# Patient Record
Sex: MALE | Race: WHITE | NOT HISPANIC OR LATINO | Employment: FULL TIME | URBAN - METROPOLITAN AREA
[De-identification: names, ages, dates, MRNs, and addresses within clinical notes are randomized per-mention and may not be internally consistent; named-entity substitution may affect disease eponyms.]

---

## 2021-05-06 ENCOUNTER — APPOINTMENT (OUTPATIENT)
Dept: RADIOLOGY | Facility: MEDICAL CENTER | Age: 26
End: 2021-05-06
Attending: EMERGENCY MEDICINE
Payer: COMMERCIAL

## 2021-05-06 ENCOUNTER — HOSPITAL ENCOUNTER (OUTPATIENT)
Facility: MEDICAL CENTER | Age: 26
End: 2021-05-07
Attending: EMERGENCY MEDICINE | Admitting: STUDENT IN AN ORGANIZED HEALTH CARE EDUCATION/TRAINING PROGRAM
Payer: COMMERCIAL

## 2021-05-06 DIAGNOSIS — M79.10 MYALGIA: ICD-10-CM

## 2021-05-06 PROBLEM — T50.Z95A VACCINATION REACTION: Status: ACTIVE | Noted: 2021-05-06

## 2021-05-06 PROBLEM — A41.9 SEPSIS (HCC): Status: ACTIVE | Noted: 2021-05-06

## 2021-05-06 PROBLEM — R74.8 ELEVATED CPK: Status: ACTIVE | Noted: 2021-05-06

## 2021-05-06 LAB
ALBUMIN SERPL BCP-MCNC: 3.4 G/DL (ref 3.2–4.9)
ALBUMIN/GLOB SERPL: 1.3 G/DL
ALP SERPL-CCNC: 101 U/L (ref 30–99)
ALT SERPL-CCNC: 32 U/L (ref 2–50)
ANION GAP SERPL CALC-SCNC: 10 MMOL/L (ref 7–16)
APPEARANCE UR: CLEAR
AST SERPL-CCNC: 24 U/L (ref 12–45)
BASOPHILS # BLD AUTO: 0.6 % (ref 0–1.8)
BASOPHILS # BLD: 0.03 K/UL (ref 0–0.12)
BILIRUB SERPL-MCNC: 0.6 MG/DL (ref 0.1–1.5)
BILIRUB UR QL STRIP.AUTO: NEGATIVE
BUN SERPL-MCNC: 12 MG/DL (ref 8–22)
CALCIUM SERPL-MCNC: 7.9 MG/DL (ref 8.5–10.5)
CHLORIDE SERPL-SCNC: 104 MMOL/L (ref 96–112)
CK SERPL-CCNC: 241 U/L (ref 0–154)
CK SERPL-CCNC: 271 U/L (ref 0–154)
CO2 SERPL-SCNC: 22 MMOL/L (ref 20–33)
COLOR UR: YELLOW
CREAT SERPL-MCNC: 0.92 MG/DL (ref 0.5–1.4)
EKG IMPRESSION: NORMAL
EOSINOPHIL # BLD AUTO: 0 K/UL (ref 0–0.51)
EOSINOPHIL NFR BLD: 0 % (ref 0–6.9)
ERYTHROCYTE [DISTWIDTH] IN BLOOD BY AUTOMATED COUNT: 36.5 FL (ref 35.9–50)
FLUAV RNA SPEC QL NAA+PROBE: NEGATIVE
FLUBV RNA SPEC QL NAA+PROBE: NEGATIVE
GLOBULIN SER CALC-MCNC: 2.6 G/DL (ref 1.9–3.5)
GLUCOSE SERPL-MCNC: 105 MG/DL (ref 65–99)
GLUCOSE UR STRIP.AUTO-MCNC: NEGATIVE MG/DL
HCT VFR BLD AUTO: 45.4 % (ref 42–52)
HGB BLD-MCNC: 16.2 G/DL (ref 14–18)
IMM GRANULOCYTES # BLD AUTO: 0.02 K/UL (ref 0–0.11)
IMM GRANULOCYTES NFR BLD AUTO: 0.4 % (ref 0–0.9)
KETONES UR STRIP.AUTO-MCNC: NEGATIVE MG/DL
LACTATE BLD-SCNC: 1.2 MMOL/L (ref 0.5–2)
LACTATE BLD-SCNC: 2.8 MMOL/L (ref 0.5–2)
LEUKOCYTE ESTERASE UR QL STRIP.AUTO: NEGATIVE
LYMPHOCYTES # BLD AUTO: 0.71 K/UL (ref 1–4.8)
LYMPHOCYTES NFR BLD: 13.8 % (ref 22–41)
MCH RBC QN AUTO: 31.5 PG (ref 27–33)
MCHC RBC AUTO-ENTMCNC: 35.7 G/DL (ref 33.7–35.3)
MCV RBC AUTO: 88.3 FL (ref 81.4–97.8)
MICRO URNS: NORMAL
MONOCYTES # BLD AUTO: 0.51 K/UL (ref 0–0.85)
MONOCYTES NFR BLD AUTO: 9.9 % (ref 0–13.4)
NEUTROPHILS # BLD AUTO: 3.88 K/UL (ref 1.82–7.42)
NEUTROPHILS NFR BLD: 75.3 % (ref 44–72)
NITRITE UR QL STRIP.AUTO: NEGATIVE
NRBC # BLD AUTO: 0 K/UL
NRBC BLD-RTO: 0 /100 WBC
PH UR STRIP.AUTO: 7.5 [PH] (ref 5–8)
PLATELET # BLD AUTO: 144 K/UL (ref 164–446)
PMV BLD AUTO: 10.9 FL (ref 9–12.9)
POTASSIUM SERPL-SCNC: 3.8 MMOL/L (ref 3.6–5.5)
PROCALCITONIN SERPL-MCNC: 0.12 NG/ML
PROT SERPL-MCNC: 6 G/DL (ref 6–8.2)
PROT UR QL STRIP: NEGATIVE MG/DL
RBC # BLD AUTO: 5.14 M/UL (ref 4.7–6.1)
RBC UR QL AUTO: NEGATIVE
RSV RNA SPEC QL NAA+PROBE: NEGATIVE
SARS-COV-2 RNA RESP QL NAA+PROBE: NOTDETECTED
SODIUM SERPL-SCNC: 136 MMOL/L (ref 135–145)
SP GR UR STRIP.AUTO: 1.02
SPECIMEN SOURCE: NORMAL
TROPONIN T SERPL-MCNC: 7 NG/L (ref 6–19)
UROBILINOGEN UR STRIP.AUTO-MCNC: 1 MG/DL
WBC # BLD AUTO: 5.2 K/UL (ref 4.8–10.8)

## 2021-05-06 PROCEDURE — 700117 HCHG RX CONTRAST REV CODE 255: Performed by: EMERGENCY MEDICINE

## 2021-05-06 PROCEDURE — 80061 LIPID PANEL: CPT

## 2021-05-06 PROCEDURE — A9270 NON-COVERED ITEM OR SERVICE: HCPCS | Performed by: EMERGENCY MEDICINE

## 2021-05-06 PROCEDURE — 93005 ELECTROCARDIOGRAM TRACING: CPT

## 2021-05-06 PROCEDURE — 80053 COMPREHEN METABOLIC PANEL: CPT | Mod: 91

## 2021-05-06 PROCEDURE — 0241U HCHG SARS-COV-2 COVID-19 NFCT DS RESP RNA 4 TRGT MIC: CPT

## 2021-05-06 PROCEDURE — 82077 ASSAY SPEC XCP UR&BREATH IA: CPT

## 2021-05-06 PROCEDURE — 71275 CT ANGIOGRAPHY CHEST: CPT

## 2021-05-06 PROCEDURE — 700102 HCHG RX REV CODE 250 W/ 637 OVERRIDE(OP): Performed by: STUDENT IN AN ORGANIZED HEALTH CARE EDUCATION/TRAINING PROGRAM

## 2021-05-06 PROCEDURE — 93005 ELECTROCARDIOGRAM TRACING: CPT | Performed by: EMERGENCY MEDICINE

## 2021-05-06 PROCEDURE — 85027 COMPLETE CBC AUTOMATED: CPT

## 2021-05-06 PROCEDURE — 85025 COMPLETE CBC W/AUTO DIFF WBC: CPT

## 2021-05-06 PROCEDURE — 96365 THER/PROPH/DIAG IV INF INIT: CPT

## 2021-05-06 PROCEDURE — G0378 HOSPITAL OBSERVATION PER HR: HCPCS

## 2021-05-06 PROCEDURE — 87040 BLOOD CULTURE FOR BACTERIA: CPT

## 2021-05-06 PROCEDURE — 82550 ASSAY OF CK (CPK): CPT | Mod: 91

## 2021-05-06 PROCEDURE — 84484 ASSAY OF TROPONIN QUANT: CPT

## 2021-05-06 PROCEDURE — 83605 ASSAY OF LACTIC ACID: CPT | Mod: 91

## 2021-05-06 PROCEDURE — 99285 EMERGENCY DEPT VISIT HI MDM: CPT

## 2021-05-06 PROCEDURE — 83880 ASSAY OF NATRIURETIC PEPTIDE: CPT

## 2021-05-06 PROCEDURE — 99219 PR INITIAL OBSERVATION CARE,LEVL II: CPT | Performed by: STUDENT IN AN ORGANIZED HEALTH CARE EDUCATION/TRAINING PROGRAM

## 2021-05-06 PROCEDURE — 700105 HCHG RX REV CODE 258: Performed by: EMERGENCY MEDICINE

## 2021-05-06 PROCEDURE — A9270 NON-COVERED ITEM OR SERVICE: HCPCS | Performed by: STUDENT IN AN ORGANIZED HEALTH CARE EDUCATION/TRAINING PROGRAM

## 2021-05-06 PROCEDURE — 96375 TX/PRO/DX INJ NEW DRUG ADDON: CPT

## 2021-05-06 PROCEDURE — 700102 HCHG RX REV CODE 250 W/ 637 OVERRIDE(OP): Performed by: EMERGENCY MEDICINE

## 2021-05-06 PROCEDURE — 700111 HCHG RX REV CODE 636 W/ 250 OVERRIDE (IP): Performed by: EMERGENCY MEDICINE

## 2021-05-06 PROCEDURE — 84145 PROCALCITONIN (PCT): CPT

## 2021-05-06 PROCEDURE — 72128 CT CHEST SPINE W/O DYE: CPT

## 2021-05-06 PROCEDURE — 36415 COLL VENOUS BLD VENIPUNCTURE: CPT

## 2021-05-06 PROCEDURE — 81003 URINALYSIS AUTO W/O SCOPE: CPT

## 2021-05-06 PROCEDURE — C9803 HOPD COVID-19 SPEC COLLECT: HCPCS | Performed by: EMERGENCY MEDICINE

## 2021-05-06 RX ORDER — CHLORAL HYDRATE 500 MG
1000 CAPSULE ORAL DAILY
Status: SHIPPED | COMMUNITY
End: 2022-07-25

## 2021-05-06 RX ORDER — HEPARIN SODIUM 5000 [USP'U]/ML
5000 INJECTION, SOLUTION INTRAVENOUS; SUBCUTANEOUS EVERY 8 HOURS
Status: DISCONTINUED | OUTPATIENT
Start: 2021-05-06 | End: 2021-05-07 | Stop reason: HOSPADM

## 2021-05-06 RX ORDER — GUAIFENESIN/DEXTROMETHORPHAN 100-10MG/5
10 SYRUP ORAL EVERY 6 HOURS PRN
Status: DISCONTINUED | OUTPATIENT
Start: 2021-05-06 | End: 2021-05-07 | Stop reason: HOSPADM

## 2021-05-06 RX ORDER — PROMETHAZINE HYDROCHLORIDE 25 MG/1
12.5-25 SUPPOSITORY RECTAL EVERY 4 HOURS PRN
Status: DISCONTINUED | OUTPATIENT
Start: 2021-05-06 | End: 2021-05-07 | Stop reason: HOSPADM

## 2021-05-06 RX ORDER — ONDANSETRON 2 MG/ML
4 INJECTION INTRAMUSCULAR; INTRAVENOUS EVERY 4 HOURS PRN
Status: DISCONTINUED | OUTPATIENT
Start: 2021-05-06 | End: 2021-05-07 | Stop reason: HOSPADM

## 2021-05-06 RX ORDER — SODIUM CHLORIDE 9 MG/ML
1000 INJECTION, SOLUTION INTRAVENOUS CONTINUOUS
Status: ACTIVE | OUTPATIENT
Start: 2021-05-06 | End: 2021-05-06

## 2021-05-06 RX ORDER — NAPROXEN 500 MG/1
500 TABLET ORAL 2 TIMES DAILY
Status: DISCONTINUED | OUTPATIENT
Start: 2021-05-06 | End: 2021-05-07 | Stop reason: HOSPADM

## 2021-05-06 RX ORDER — CYCLOBENZAPRINE HCL 5 MG
5 TABLET ORAL EVERY EVENING
Status: DISCONTINUED | OUTPATIENT
Start: 2021-05-06 | End: 2021-05-07

## 2021-05-06 RX ORDER — NAPROXEN SODIUM 220 MG
880 TABLET ORAL 2 TIMES DAILY WITH MEALS
Status: ON HOLD | COMMUNITY
End: 2021-05-07

## 2021-05-06 RX ORDER — BISACODYL 10 MG
10 SUPPOSITORY, RECTAL RECTAL
Status: DISCONTINUED | OUTPATIENT
Start: 2021-05-06 | End: 2021-05-07 | Stop reason: HOSPADM

## 2021-05-06 RX ORDER — LABETALOL HYDROCHLORIDE 5 MG/ML
10 INJECTION, SOLUTION INTRAVENOUS EVERY 4 HOURS PRN
Status: DISCONTINUED | OUTPATIENT
Start: 2021-05-06 | End: 2021-05-07 | Stop reason: HOSPADM

## 2021-05-06 RX ORDER — ACETAMINOPHEN 325 MG/1
650 TABLET ORAL EVERY 6 HOURS PRN
Status: DISCONTINUED | OUTPATIENT
Start: 2021-05-06 | End: 2021-05-07 | Stop reason: HOSPADM

## 2021-05-06 RX ORDER — PROMETHAZINE HYDROCHLORIDE 25 MG/1
12.5-25 TABLET ORAL EVERY 4 HOURS PRN
Status: DISCONTINUED | OUTPATIENT
Start: 2021-05-06 | End: 2021-05-07 | Stop reason: HOSPADM

## 2021-05-06 RX ORDER — KETOROLAC TROMETHAMINE 30 MG/ML
30 INJECTION, SOLUTION INTRAMUSCULAR; INTRAVENOUS ONCE
Status: COMPLETED | OUTPATIENT
Start: 2021-05-06 | End: 2021-05-06

## 2021-05-06 RX ORDER — DIAZEPAM 5 MG/ML
5 INJECTION, SOLUTION INTRAMUSCULAR; INTRAVENOUS ONCE
Status: COMPLETED | OUTPATIENT
Start: 2021-05-06 | End: 2021-05-06

## 2021-05-06 RX ORDER — ONDANSETRON 4 MG/1
4 TABLET, ORALLY DISINTEGRATING ORAL EVERY 4 HOURS PRN
Status: DISCONTINUED | OUTPATIENT
Start: 2021-05-06 | End: 2021-05-07 | Stop reason: HOSPADM

## 2021-05-06 RX ORDER — PROCHLORPERAZINE EDISYLATE 5 MG/ML
5-10 INJECTION INTRAMUSCULAR; INTRAVENOUS EVERY 4 HOURS PRN
Status: DISCONTINUED | OUTPATIENT
Start: 2021-05-06 | End: 2021-05-07 | Stop reason: HOSPADM

## 2021-05-06 RX ORDER — POLYETHYLENE GLYCOL 3350 17 G/17G
1 POWDER, FOR SOLUTION ORAL
Status: DISCONTINUED | OUTPATIENT
Start: 2021-05-06 | End: 2021-05-07 | Stop reason: HOSPADM

## 2021-05-06 RX ORDER — ACETAMINOPHEN 325 MG/1
650 TABLET ORAL ONCE
Status: COMPLETED | OUTPATIENT
Start: 2021-05-06 | End: 2021-05-06

## 2021-05-06 RX ORDER — AMOXICILLIN 250 MG
2 CAPSULE ORAL 2 TIMES DAILY
Status: DISCONTINUED | OUTPATIENT
Start: 2021-05-06 | End: 2021-05-07 | Stop reason: HOSPADM

## 2021-05-06 RX ADMIN — ACETAMINOPHEN 650 MG: 325 TABLET, FILM COATED ORAL at 18:32

## 2021-05-06 RX ADMIN — SODIUM CHLORIDE 1000 ML: 9 INJECTION, SOLUTION INTRAVENOUS at 19:58

## 2021-05-06 RX ADMIN — NAPROXEN 500 MG: 500 TABLET ORAL at 23:23

## 2021-05-06 RX ADMIN — CYCLOBENZAPRINE 5 MG: 10 TABLET, FILM COATED ORAL at 23:23

## 2021-05-06 RX ADMIN — KETOROLAC TROMETHAMINE 30 MG: 30 INJECTION, SOLUTION INTRAMUSCULAR; INTRAVENOUS at 18:32

## 2021-05-06 RX ADMIN — IOHEXOL 100 ML: 350 INJECTION, SOLUTION INTRAVENOUS at 19:09

## 2021-05-06 RX ADMIN — CEFTRIAXONE SODIUM 2 G: 2 INJECTION, POWDER, FOR SOLUTION INTRAMUSCULAR; INTRAVENOUS at 19:57

## 2021-05-06 RX ADMIN — SODIUM CHLORIDE 1000 ML: 9 INJECTION, SOLUTION INTRAVENOUS at 18:32

## 2021-05-06 RX ADMIN — DIAZEPAM 5 MG: 5 INJECTION, SOLUTION INTRAMUSCULAR; INTRAVENOUS at 18:32

## 2021-05-06 ASSESSMENT — PAIN DESCRIPTION - PAIN TYPE
TYPE: ACUTE PAIN
TYPE: ACUTE PAIN

## 2021-05-06 NOTE — ED TRIAGE NOTES
Chief Complaint   Patient presents with   • Back Pain     mid back spasm, ambulatory   • Other     post covid vaccine fever, body aches

## 2021-05-07 VITALS
HEIGHT: 72 IN | TEMPERATURE: 98.1 F | WEIGHT: 182.76 LBS | SYSTOLIC BLOOD PRESSURE: 114 MMHG | OXYGEN SATURATION: 95 % | RESPIRATION RATE: 17 BRPM | HEART RATE: 84 BPM | DIASTOLIC BLOOD PRESSURE: 68 MMHG | BODY MASS INDEX: 24.75 KG/M2

## 2021-05-07 LAB
ALBUMIN SERPL BCP-MCNC: 3.4 G/DL (ref 3.2–4.9)
ALBUMIN/GLOB SERPL: 1.4 G/DL
ALP SERPL-CCNC: 95 U/L (ref 30–99)
ALT SERPL-CCNC: 31 U/L (ref 2–50)
ANION GAP SERPL CALC-SCNC: 6 MMOL/L (ref 7–16)
AST SERPL-CCNC: 23 U/L (ref 12–45)
BILIRUB SERPL-MCNC: 0.4 MG/DL (ref 0.1–1.5)
BUN SERPL-MCNC: 10 MG/DL (ref 8–22)
CALCIUM SERPL-MCNC: 7.8 MG/DL (ref 8.5–10.5)
CHLORIDE SERPL-SCNC: 109 MMOL/L (ref 96–112)
CHOLEST SERPL-MCNC: 115 MG/DL (ref 100–199)
CO2 SERPL-SCNC: 24 MMOL/L (ref 20–33)
CREAT SERPL-MCNC: 0.9 MG/DL (ref 0.5–1.4)
EKG IMPRESSION: NORMAL
ERYTHROCYTE [DISTWIDTH] IN BLOOD BY AUTOMATED COUNT: 37.9 FL (ref 35.9–50)
EST. AVERAGE GLUCOSE BLD GHB EST-MCNC: 94 MG/DL
ETHANOL BLD-MCNC: <10.1 MG/DL (ref 0–10)
GLOBULIN SER CALC-MCNC: 2.4 G/DL (ref 1.9–3.5)
GLUCOSE SERPL-MCNC: 114 MG/DL (ref 65–99)
HBA1C MFR BLD: 4.9 % (ref 4–5.6)
HCT VFR BLD AUTO: 41.5 % (ref 42–52)
HDLC SERPL-MCNC: 40 MG/DL
HGB BLD-MCNC: 14.2 G/DL (ref 14–18)
LACTATE BLD-SCNC: 1.3 MMOL/L (ref 0.5–2)
LDLC SERPL CALC-MCNC: 62 MG/DL
MCH RBC QN AUTO: 30.9 PG (ref 27–33)
MCHC RBC AUTO-ENTMCNC: 34.2 G/DL (ref 33.7–35.3)
MCV RBC AUTO: 90.2 FL (ref 81.4–97.8)
NT-PROBNP SERPL IA-MCNC: <5 PG/ML (ref 0–125)
PLATELET # BLD AUTO: 107 K/UL (ref 164–446)
PMV BLD AUTO: 10.9 FL (ref 9–12.9)
POTASSIUM SERPL-SCNC: 3.8 MMOL/L (ref 3.6–5.5)
PROT SERPL-MCNC: 5.8 G/DL (ref 6–8.2)
RBC # BLD AUTO: 4.6 M/UL (ref 4.7–6.1)
SODIUM SERPL-SCNC: 139 MMOL/L (ref 135–145)
TRIGL SERPL-MCNC: 67 MG/DL (ref 0–149)
WBC # BLD AUTO: 4.4 K/UL (ref 4.8–10.8)

## 2021-05-07 PROCEDURE — A9270 NON-COVERED ITEM OR SERVICE: HCPCS | Performed by: STUDENT IN AN ORGANIZED HEALTH CARE EDUCATION/TRAINING PROGRAM

## 2021-05-07 PROCEDURE — G0378 HOSPITAL OBSERVATION PER HR: HCPCS

## 2021-05-07 PROCEDURE — A9270 NON-COVERED ITEM OR SERVICE: HCPCS | Performed by: NURSE PRACTITIONER

## 2021-05-07 PROCEDURE — 700102 HCHG RX REV CODE 250 W/ 637 OVERRIDE(OP): Performed by: STUDENT IN AN ORGANIZED HEALTH CARE EDUCATION/TRAINING PROGRAM

## 2021-05-07 PROCEDURE — 97535 SELF CARE MNGMENT TRAINING: CPT

## 2021-05-07 PROCEDURE — 700102 HCHG RX REV CODE 250 W/ 637 OVERRIDE(OP): Performed by: NURSE PRACTITIONER

## 2021-05-07 PROCEDURE — 93005 ELECTROCARDIOGRAM TRACING: CPT | Performed by: STUDENT IN AN ORGANIZED HEALTH CARE EDUCATION/TRAINING PROGRAM

## 2021-05-07 PROCEDURE — 99217 PR OBSERVATION CARE DISCHARGE: CPT | Performed by: INTERNAL MEDICINE

## 2021-05-07 PROCEDURE — 93010 ELECTROCARDIOGRAM REPORT: CPT | Performed by: INTERNAL MEDICINE

## 2021-05-07 PROCEDURE — 83036 HEMOGLOBIN GLYCOSYLATED A1C: CPT

## 2021-05-07 RX ORDER — CYCLOBENZAPRINE HCL 5 MG
10 TABLET ORAL ONCE
Status: COMPLETED | OUTPATIENT
Start: 2021-05-07 | End: 2021-05-07

## 2021-05-07 RX ORDER — ACETAMINOPHEN 325 MG/1
650 TABLET ORAL EVERY 6 HOURS PRN
Qty: 30 TABLET | Refills: 0 | Status: SHIPPED | COMMUNITY
Start: 2021-05-07 | End: 2021-08-31

## 2021-05-07 RX ORDER — CYCLOBENZAPRINE HCL 5 MG
5-10 TABLET ORAL 3 TIMES DAILY PRN
Qty: 18 TABLET | Refills: 0 | Status: SHIPPED | OUTPATIENT
Start: 2021-05-07 | End: 2021-05-10

## 2021-05-07 RX ADMIN — CYCLOBENZAPRINE HYDROCHLORIDE 10 MG: 5 TABLET, FILM COATED ORAL at 08:35

## 2021-05-07 RX ADMIN — NAPROXEN 500 MG: 500 TABLET ORAL at 05:04

## 2021-05-07 ASSESSMENT — PATIENT HEALTH QUESTIONNAIRE - PHQ9
1. LITTLE INTEREST OR PLEASURE IN DOING THINGS: NOT AT ALL
SUM OF ALL RESPONSES TO PHQ9 QUESTIONS 1 AND 2: 0
1. LITTLE INTEREST OR PLEASURE IN DOING THINGS: NOT AT ALL
2. FEELING DOWN, DEPRESSED, IRRITABLE, OR HOPELESS: NOT AT ALL
SUM OF ALL RESPONSES TO PHQ9 QUESTIONS 1 AND 2: 0
2. FEELING DOWN, DEPRESSED, IRRITABLE, OR HOPELESS: NOT AT ALL

## 2021-05-07 ASSESSMENT — LIFESTYLE VARIABLES
DOES PATIENT WANT TO STOP DRINKING: NO
TOTAL SCORE: 0
AVERAGE NUMBER OF DAYS PER WEEK YOU HAVE A DRINK CONTAINING ALCOHOL: 0
CONSUMPTION TOTAL: NEGATIVE
ON A TYPICAL DAY WHEN YOU DRINK ALCOHOL HOW MANY DRINKS DO YOU HAVE: 0
TOTAL SCORE: 0
ALCOHOL_USE: NO
HAVE YOU EVER FELT YOU SHOULD CUT DOWN ON YOUR DRINKING: NO
EVER HAD A DRINK FIRST THING IN THE MORNING TO STEADY YOUR NERVES TO GET RID OF A HANGOVER: NO
HAVE PEOPLE ANNOYED YOU BY CRITICIZING YOUR DRINKING: NO
TOTAL SCORE: 0
HOW MANY TIMES IN THE PAST YEAR HAVE YOU HAD 5 OR MORE DRINKS IN A DAY: 0
EVER FELT BAD OR GUILTY ABOUT YOUR DRINKING: NO

## 2021-05-07 ASSESSMENT — PAIN DESCRIPTION - PAIN TYPE
TYPE: ACUTE PAIN
TYPE: ACUTE PAIN

## 2021-05-07 ASSESSMENT — FIBROSIS 4 INDEX: FIB4 SCORE: 0.74

## 2021-05-07 NOTE — THERAPY
Physical Therapy   Education only     Patient Name: Cesar Rubio  Age:  25 y.o., Sex:  male  Medical Record #: [id number]  Today's Date: 5/7/2021     Assessment    Pt was recently admitted for mid back spasm and body aches from COVID vaccines. Pt and RN state pt is primarily I with functional mobility and ambulation at this time. Pt provided with education on body mechanics, icing, resting, and gentle stretching to improve mid back thoracic pain. Pt educated on activity limitations and to avoid going to the gym at this time. Pt was agreeable. Pt is in no acute skilled PT needs at this time, anticipate pt to d/c home once medically clear. Recommend outpatient physical therapy services to address higher level deficits and for pain management of thoracic back pain.     SUNSHINE FairT

## 2021-05-07 NOTE — ED NOTES
Pt to CDU with transport and personal belongings.    I have personally performed a face to face diagnostic evaluation on this patient. I have reviewed the ACP note and agree with the history, exam and plan of care, except as noted.

## 2021-05-07 NOTE — ASSESSMENT & PLAN NOTE
In setting of 2nd covid vaccine given in right shoulder evening of 5/5/21  In setting of recent strenuous physical activity and soft tissue strain  >2L IVF given in ED  >Trend CPK in AM for improvement  Restart IVF in AM (Patient refused further IVF)

## 2021-05-07 NOTE — ASSESSMENT & PLAN NOTE
After 2nd Pfitzer vaccine in setting of strained back and fatigued muscles from strenuous exercise prior to receiving vaccine.  -assoc with fever, myalgia, mild Cpk ELEVATION  >Trend CPK, give IVF  >Naproxen, Muscle relaxants  >Septic workup, lactic acid trended down with IVF, f/u BCx obtained in ED  >Drug and alcohol tox

## 2021-05-07 NOTE — PROGRESS NOTES
2 RN Skin Assessment Completed by Sheri RN and CAPRI Dorantes.     Head: WDL  Ears: WDL  Nose: WDL  Mouth: WDL  Neck: WDL  Breasts/Chest: WDL  Shoulder Blades: WDL  Spine: WDL  (R) Arm/Elbow/hand: WDL  (L) Arm/Elbow/hand: WDL  Abdomen:WDL  Groin: WDL  Sacrum/Coccyx/Buttocks: WDL  (R) Leg: WDL  (L) Leg: WDL  (R) Heel/Foot/Toe: WDL  (L) Heel/Foot/Toe: WDL              Devices in place: BP Cuff and Pulse Ox     Interventions in place: Pillows     Possible skin injury found: No     Pictures uploaded into Epic: N/A  Wound Consult Placed: N/A

## 2021-05-07 NOTE — PROGRESS NOTES
Pt arrived to unit via gurney at 0010. Pt oriented to room, unit, and plan of care. All questions answered at this time. Call light within reach, fall precautions in place, will continue to monitor.

## 2021-05-07 NOTE — ASSESSMENT & PLAN NOTE
This is Sepsis Present on admission  SIRS criteria identified on my evaluation include: Fever, with temperature greater than 101 deg F, Tachycardia, with heart rate greater than 90 BPM and Leukocytosis, with WBC greater than 12,000  Source is Unknown  Suspected onset of infection (date and time) 5/5/21  Sepsis protocol initiated  Fluid resuscitation ordered per protocol  IV antibiotics as appropriate for source of sepsis  While organ dysfunction may be noted elsewhere in this problem list or in the chart, degree of organ dysfunction does not meet CMS criteria for severe sepsis    >f/u BCx, Procal negative. WBC negative but with left shift. Given rocephin in ED, will watch off antibiotics for now for resolution of septic criteria or worsening. UA negative.

## 2021-05-07 NOTE — PROGRESS NOTES
IV AND TELE DCED INSTRUCTIONS GIVEN ALL QUESTIONS ANSWERED  TO LOBBY WITH STAFF VIA AMB CONDITION STABLE

## 2021-05-07 NOTE — DISCHARGE SUMMARY
Discharge Summary    CHIEF COMPLAINT ON ADMISSION  Chief Complaint   Patient presents with   • Back Pain     mid back spasm, ambulatory   • Other     post covid vaccine fever, body aches       Reason for Admission  Other     Admission Date  5/6/2021    CODE STATUS  Full Code    HPI & HOSPITAL COURSE  Patient is a pleasant 25-year-old male with no known medical problems.  Patient presented to the emergency room with complaints of severe back spasms, fever, mild nausea and malaise following second Pfizer vaccination.  Patient states he normally works out 5 days a week in the gym and feels he may have pulled a muscle in his back the day prior to his vaccination.  Back pain significant therefore patient admitted to CDU overnight for observation and PT evaluation following morning.  Patient was rated symptom management as needed with antiemetics, analgesics and muscle relaxers.  Patient placed on IV fluid hydration.  CT T-spine completed which showed no acute fracture or malalignment appreciated.  CTA chest showed no evidence of pulmonary emboli.  Patient noted to have mild CPK elevation however decreased with IV fluid hydration.  Lactic acid initially 2.8 however returned to baseline with IV fluid hydration.  On exam this morning patient states he feels significantly better, no longer current complaints of fever, chills or flulike symptoms.  States back pain is more like usual muscle pain he is used to from pulling things at the gym.  Patient states tolerable with muscle relaxers.  Patient evaluated by physical therapy who states he can continue with outpatient therapy.  Patient unsure if he would like to participate in outpatient therapy and feels he can manage this at home with stretching and light exercise.  Referral placed if patient changes his mind about obtaining outpatient physical therapy.  Patient's vital signs are stable and patient states he feels he is ready to discharge home at this time.  Muscle relaxer  prescription with a limited amount and quantity sent to pharmacy.  Patient up ambulating independently and ready for discharge at this time.  Patient to follow-up with his PCP.    Therefore, he is discharged in good and stable condition to home with close outpatient follow-up.        Discharge Date  05/07/21    FOLLOW UP ITEMS POST DISCHARGE  Please follow-up with PCP as needed  Take medications as needed  Take ibuprofen with food and limit to 600 mg at a time.  Do not drink or drive while on muscle relaxers    DISCHARGE DIAGNOSES  Active Problems:    Vaccination reaction POA: Yes    Elevated CPK POA: Yes    Myalgia POA: Yes    Sepsis (HCC) POA: Yes  Resolved Problems:    * No resolved hospital problems. *      FOLLOW UP    [address] [address] [phone]          MEDICATIONS ON DISCHARGE     Medication List      START taking these medications      Instructions   acetaminophen 325 MG Tabs  Commonly known as: Tylenol   Take 2 Tablets by mouth every 6 hours as needed.  Dose: 650 mg     cyclobenzaprine 5 mg tablet  Commonly known as: Flexeril   Take 1-2 Tablets by mouth 3 times a day as needed for Moderate Pain or Muscle Spasms for up to 3 days.  Dose: 5-10 mg        CONTINUE taking these medications      Instructions   fish oil 1000 MG Caps capsule   Take 1,000 mg by mouth every day.  Dose: 1,000 mg        STOP taking these medications    Aleve 220 MG tablet  Generic drug: naproxen            Allergies  No Known Allergies    DIET  Orders Placed This Encounter   Procedures   • Diet Order Diet: Cardiac     Standing Status:   Standing     Number of Occurrences:   1     Order Specific Question:   Diet:     Answer:   Cardiac [6]       ACTIVITY  As tolerated.  Weight bearing as tolerated    CONSULTATIONS  None     PROCEDURES  None     LABORATORY  Lab Results   Component Value Date    SODIUM 139 05/06/2021    POTASSIUM 3.8 05/06/2021    CHLORIDE 109 05/06/2021    CO2  24 05/06/2021    GLUCOSE 114 (H) 05/06/2021    BUN 10 05/06/2021    CREATININE 0.90 05/06/2021        Lab Results   Component Value Date    WBC 4.4 (L) 05/06/2021    HEMOGLOBIN 14.2 05/06/2021    HEMATOCRIT 41.5 (L) 05/06/2021    PLATELETCT 107 (L) 05/06/2021        Total time of the discharge process exceeds 37 minutes.

## 2021-05-07 NOTE — ED NOTES
IV started. Blood drawn, urine collected, and sent to lab. Patient medicated per MAR. Updated on POC, pt agrees

## 2021-05-07 NOTE — ED PROVIDER NOTES
ED Provider Note    CHIEF COMPLAINT  Chief Complaint   Patient presents with   • Back Pain     mid back spasm, ambulatory   • Other     post covid vaccine fever, body aches       HPI  Cesar Rubio is a 25 y.o. male who presents to the emergency room today with fever of 102 degrees, back pain/spasms, body aches and generalized malaise.  Patient states he started feeling bad after working out on Wednesday with sudden onset following of back spasm to his lower back that went away.  He received a Covid shot/vaccine recently a second vaccine of Pfizer.  Following this he developed fever chills increasing pain and muscle aches pain over his mid back below his shoulder blades.  No nausea vomiting no cough or congestion or sore throat.  Patient was tachycardic, with fever and meeting SIRS criteria of 3 and septic protocol initiated.  He was given antibiotics here of Rocephin.  Suspect that this may be reaction to his Covid vaccination however his lactic acid was elevated he was given IV fluids 2 L.,  Lactic acid was elevated.  Pain is over his mid to lower back worse with movement or direct palpation.    REVIEW OF SYSTEMS  See HPI for further details. All other systems are negative.     PAST MEDICAL HISTORY  No past medical history on file.    FAMILY HISTORY  [unfilled]    SOCIAL HISTORY  Social History     Socioeconomic History   • Marital status: Single     Spouse name: Not on file   • Number of children: Not on file   • Years of education: Not on file   • Highest education level: Not on file   Occupational History   • Not on file   Tobacco Use   • Smoking status: Never Smoker   • Smokeless tobacco: Never Used   Substance and Sexual Activity   • Alcohol use: Never   • Drug use: Never   • Sexual activity: Not on file   Other Topics Concern   • Not on file   Social History Narrative   • Not on file     Social Determinants of Health     Financial Resource Strain:    • Difficulty of Paying Living Expenses:    Food  Insecurity:    • Worried About Running Out of Food in the Last Year:    • Ran Out of Food in the Last Year:    Transportation Needs:    • Lack of Transportation (Medical):    • Lack of Transportation (Non-Medical):    Physical Activity:    • Days of Exercise per Week:    • Minutes of Exercise per Session:    Stress:    • Feeling of Stress :    Social Connections:    • Frequency of Communication with Friends and Family:    • Frequency of Social Gatherings with Friends and Family:    • Attends Congregational Services:    • Active Member of Clubs or Organizations:    • Attends Club or Organization Meetings:    • Marital Status:    Intimate Partner Violence:    • Fear of Current or Ex-Partner:    • Emotionally Abused:    • Physically Abused:    • Sexually Abused:        SURGICAL HISTORY  No past surgical history on file.    CURRENT MEDICATIONS  Home Medications     Reviewed by Ninoska Marie (Pharmacy Tech) on 05/06/21 at 4330  Med List Status: Complete   Medication Last Dose Status   naproxen (ALEVE) 220 MG tablet 5/6/2021 Active   Omega-3 Fatty Acids (FISH OIL) 1000 MG Cap capsule >3 days ago Active                ALLERGIES  No Known Allergies    PHYSICAL EXAM  VITAL SIGNS: /57   Pulse 85   Temp (!) 38.8 °C (101.9 °F) (Oral)   Resp 18   Wt 81.5 kg (179 lb 10.8 oz)   SpO2 96%       Constitutional: Well developed, Well nourished,  acute distress, Non-toxic appearance.   HENT: Normocephalic, Atraumatic, Bilateral external ears normal, Oropharynx moist, No oral exudates, Nose normal.   Eyes: PERRLA, EOMI, Conjunctiva normal, No discharge.   Neck: Normal range of motion, No tenderness, Supple, No stridor.  No meningeal signs noted  Lymphatic: No lymphadenopathy noted.   Cardiovascular: Normal heart rate, Normal rhythm, No murmurs, No rubs, No gallops.   Thorax & Lungs: Normal breath sounds, No respiratory distress, No wheezing, No chest tenderness.   Abdomen: Bowel sounds normal, Soft, No tenderness, No  masses, No pulsatile masses.   Skin: Warm, Dry, No erythema, No rash.   Back: Thoracolumbar tenderness, No CVA tenderness.  No deformity or signs or symptoms of infection    Extremities: Intact distal pulses, No edema, No tenderness, No cyanosis, No clubbing.   Musculoskeletal: Good range of motion in all major joints. No tenderness to palpation or major deformities noted.   Neurologic: Alert & oriented x 3, Normal motor function, Normal sensory function, No focal deficits noted.   Psychiatric: Affect normal, Judgment normal, Mood normal.     EKG  Sinus tachycardia 130 bpm, no ST elevation or depression, no widening of the QRS complex, good R wave progression, NJ intervals are normal, this is a 12-lead EKG there is no previous EKG available at this time for comparison    RADIOLOGY/PROCEDURES  CT-TSPINE W/O PLUS RECONS   Final Result         1. No acute fracture or malalignment appreciated in the thoracic spine         CT-CTA CHEST PULMONARY ARTERY W/ RECONS   Final Result      1.  No evidence of pulmonary embolism.      2.  Clear lung parenchyma.                  COURSE & MEDICAL DECISION MAKING  Pertinent Labs & Imaging studies reviewed. (See chart for details)  Patient had CT scan performed because there was some pain that crossed over the chest with some shortness of breath and tachycardia this was negative for PE there is no evidence of Covid pneumonia.  CT scan his lumbar spine showed no acute processes this is seem to be the origination of his current pain.  He has no history of IVDA, he had received Covid vaccine developed fever this may be reaction to there is no obvious source of infection L he did follow septic protocol was given fluids at greater than 30 cc/kg and his heart rate has come down and responded and he was also given antibiotics of Rocephin.  I discussed the case with his mother by phone.  Patient was given pain medication and did get good results with this and his temperature has come down.   Discussed case with hospitalist as is lactic acid was elevated 2.8 and he will be admitted for further monitoring and treatment.  Please see orders for further planning care.    FINAL IMPRESSION  1.  Febrile illness  2.  Covid vaccine reaction  3.  Sepsis, lactic acid of 2.8  4.  Myalgia  5.  Lumbar back pain         Electronically signed by: Moe Rivas D.O., 5/6/2021 11:17 PM

## 2021-05-07 NOTE — ED NOTES
Med rec complete per interview with pt at bedside. Pt states that he took a methocarbamol that he received from a friend earlier today. Allergies reviewed. Pt denies any antibiotic use in the past 14 days.

## 2021-05-07 NOTE — H&P
"Hospital Medicine History & Physical Note    Date of Service  5/6/2021    Primary Care Physician  No primary care provider on file.    Consultants  None    Code Status  Full Code    Chief Complaint  Chief Complaint   Patient presents with   • Back Pain     mid back spasm, ambulatory   • Other     post covid vaccine fever, body aches       History of Presenting Illness  25M never hospitalized before no known medical problems presented with myalgia, severe back spasms, fever, mild nausea, and malaise following 2nd Pfitzer vaccination. Evening of 5/5. Patient reports doing heavy strenuous exercise in gym on the 5th. 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 He stated during his gym session before getting the vaccination, he felt he \"pulled or strained a ligament in my back\". Otherwise, patient Denies sob, cough, chest pain/pressure, congestion, extremity swelling, diaphoresis, dizziness, weakness, unintentional weight changes, fever, chills, vomiting, hemoptysis, diarrhea, constipation, dysuria/dyspareunia, polyuria, or polydipsia.    Denies history of Malignancy, drug, alcohol, or cannabinoid use.      Review of Systems  ROS  10+ systems reviewed and negative except as noted in HPI.    Past Medical History   has no past medical history on file.    Surgical History   has no past surgical history on file.     Family History  No history of autoimmune or immunodeficiency in parents or grandparents.    Social History   reports that he has never smoked. He has never used smokeless tobacco. He reports that he does not drink alcohol and does not use drugs.    Allergies  No Known Allergies    Medications  Prior to Admission Medications   Prescriptions Last Dose Informant Patient Reported? Taking?   Omega-3 Fatty Acids (FISH OIL) 1000 MG Cap capsule >3 days ago at unknown Patient Yes Yes   Sig: Take 1,000 mg by mouth every day.   naproxen (ALEVE) 220 MG tablet 5/6/2021 at 1000 Patient Yes Yes   Sig: Take 880 mg " by mouth 2 times a day with meals.      Facility-Administered Medications: None       Physical Exam  Temp:  [37.8 °C (100 °F)-38.8 °C (101.9 °F)] 38.8 °C (101.9 °F)  Pulse:  [] 85  Resp:  [18-20] 18  BP: (101-120)/(54-81) 104/57  SpO2:  [95 %-99 %] 96 %    Physical Exam  Physical Exam  Vitals and nursing note reviewed.  Constitutional:     General: Alert in no acute distress.    Appearance: Pt is not ill-appearing.     Comments:   HENT: No signs of trauma, Bilateral external ears normal, Nose normal.      Head: Normocephalic.     Mouth/Throat: Unremarkable. Moist Mucosa     Comments:   Eyes:      Pupils: Pupils are equal round and reactive to light, Conjunctiva normal, Non-icteric.   Neck: Normal range of motion, No tenderness, Supple, No stridor.   Cardiovascular:      Rate and Rhythm: Regular Rate and Rhythm     Heart sounds: No murmur, rubs, or gallops appreciated.  Pulmonary/Thorax & Lungs:      Effort: No respiratory distress.     Breath sounds: No stridor. No wheezing, No Rhonchi, no palpable chest tenderness     Comments:    Abdomen:     General: There is no distension.      Palpation/Auscultation: Soft, No tenderness, No masses, No pulsatile masses. Bowel sounds normal. No rebound/peritoneal signs. Negative Traore sign.     Hernia: No hernia is appreciated at this time.   Skin: Warm, Dry, No erythema, No rash.       Coloration: Skin is not jaundiced or pale.   Back: No bony tenderness, No CVA tenderness.   Musculoskeletal:         General: Difficult to complete exam, patient lying on side in back pain exacerbated by movement. Gross motor strength 5/5 x4 limbs, decreased lumbothoracic range of motion in all planes, decreased hip flexion 2/2 back pain. Some Decreased ROM all 4 extremities due to pain.  Extremities:       General: Intact distal pulses, No edema, No tenderness, No cyanosis      Vascular:   Neurologic/Psych: Alert, No focal deficits noted.       Comments:                     Laboratory:  Recent Labs     05/06/21  1822   WBC 5.2   RBC 5.14   HEMOGLOBIN 16.2   HEMATOCRIT 45.4   MCV 88.3   MCH 31.5   MCHC 35.7*   RDW 36.5   PLATELETCT 144*   MPV 10.9     Recent Labs     05/06/21 2003   SODIUM 136   POTASSIUM 3.8   CHLORIDE 104   CO2 22   GLUCOSE 105*   BUN 12   CREATININE 0.92   CALCIUM 7.9*     Recent Labs     05/06/21 2003   ALTSGPT 32   ASTSGOT 24   ALKPHOSPHAT 101*   TBILIRUBIN 0.6   GLUCOSE 105*         No results for input(s): NTPROBNP in the last 72 hours.      Recent Labs     05/06/21  1822   TROPONINT 7       Imaging:  CT-TSPINE W/O PLUS RECONS   Final Result         1. No acute fracture or malalignment appreciated in the thoracic spine         CT-CTA CHEST PULMONARY ARTERY W/ RECONS   Final Result      1.  No evidence of pulmonary embolism.      2.  Clear lung parenchyma.                  Assessment/Plan:  I anticipate this patient will require at least two midnights for appropriate medical management, necessitating inpatient admission.    Vaccination reaction- (present on admission)  Assessment & Plan  After 2nd Pfitzer vaccine in setting of strained back and fatigued muscles from strenuous exercise prior to receiving vaccine.  -assoc with fever, myalgia, mild Cpk ELEVATION  >Trend CPK, give IVF  >Naproxen, Muscle relaxants  >Septic workup, lactic acid trended down with IVF, f/u BCx obtained in ED  >Drug and alcohol tox    Elevated CPK- (present on admission)  Assessment & Plan  In setting of 2nd covid vaccine given in right shoulder evening of 5/5/21  In setting of recent strenuous physical activity and soft tissue strain  >2L IVF given in ED  >Trend CPK in AM for improvement  Restart IVF in AM (Patient refused further IVF)      Myalgia- (present on admission)  Assessment & Plan  Trend cpk  PT eval tomorrow after rest  Muscle relaxants  Naproxen  Restart IVF in AM    Sepsis (HCC)- (present on admission)  Assessment & Plan  This is Sepsis Present on admission  SIRS  criteria identified on my evaluation include: Fever, with temperature greater than 101 deg F, Tachycardia, with heart rate greater than 90 BPM and Leukocytosis, with WBC greater than 12,000  Source is Unknown  Suspected onset of infection (date and time) 5/5/21  Sepsis protocol initiated  Fluid resuscitation ordered per protocol  IV antibiotics as appropriate for source of sepsis  While organ dysfunction may be noted elsewhere in this problem list or in the chart, degree of organ dysfunction does not meet CMS criteria for severe sepsis    >f/u BCx, Procal negative. WBC negative but with left shift. Given rocephin in ED, will watch off antibiotics for now for resolution of septic criteria or worsening. UA negative.

## 2021-05-11 LAB
BACTERIA BLD CULT: NORMAL
BACTERIA BLD CULT: NORMAL
SIGNIFICANT IND 70042: NORMAL
SIGNIFICANT IND 70042: NORMAL
SITE SITE: NORMAL
SITE SITE: NORMAL
SOURCE SOURCE: NORMAL
SOURCE SOURCE: NORMAL

## 2021-08-30 ENCOUNTER — TELEPHONE (OUTPATIENT)
Dept: SCHEDULING | Facility: IMAGING CENTER | Age: 26
End: 2021-08-30

## 2021-08-31 ENCOUNTER — OFFICE VISIT (OUTPATIENT)
Dept: MEDICAL GROUP | Facility: IMAGING CENTER | Age: 26
End: 2021-08-31
Payer: COMMERCIAL

## 2021-08-31 VITALS
SYSTOLIC BLOOD PRESSURE: 114 MMHG | BODY MASS INDEX: 25.33 KG/M2 | WEIGHT: 187 LBS | HEIGHT: 72 IN | TEMPERATURE: 99 F | DIASTOLIC BLOOD PRESSURE: 78 MMHG | OXYGEN SATURATION: 95 % | HEART RATE: 75 BPM

## 2021-08-31 DIAGNOSIS — Z13.31 SCREENING FOR DEPRESSION: ICD-10-CM

## 2021-08-31 DIAGNOSIS — Z71.3 DIETARY COUNSELING: ICD-10-CM

## 2021-08-31 DIAGNOSIS — Z71.82 EXERCISE COUNSELING: ICD-10-CM

## 2021-08-31 DIAGNOSIS — B35.6 TINEA CRURIS: ICD-10-CM

## 2021-08-31 DIAGNOSIS — Z00.00 WELLNESS EXAMINATION: ICD-10-CM

## 2021-08-31 PROBLEM — A41.9 SEPSIS (HCC): Status: RESOLVED | Noted: 2021-05-06 | Resolved: 2021-08-31

## 2021-08-31 PROBLEM — M79.10 MYALGIA: Status: RESOLVED | Noted: 2021-05-06 | Resolved: 2021-08-31

## 2021-08-31 PROBLEM — R21 RASH: Status: ACTIVE | Noted: 2021-08-31

## 2021-08-31 PROBLEM — T50.Z95A VACCINATION REACTION: Status: RESOLVED | Noted: 2021-05-06 | Resolved: 2021-08-31

## 2021-08-31 PROBLEM — R74.8 ELEVATED CPK: Status: RESOLVED | Noted: 2021-05-06 | Resolved: 2021-08-31

## 2021-08-31 PROCEDURE — 99214 OFFICE O/P EST MOD 30 MIN: CPT | Performed by: PHYSICIAN ASSISTANT

## 2021-08-31 ASSESSMENT — PATIENT HEALTH QUESTIONNAIRE - PHQ9: CLINICAL INTERPRETATION OF PHQ2 SCORE: 0

## 2021-08-31 ASSESSMENT — PAIN SCALES - GENERAL: PAINLEVEL: NO PAIN

## 2021-08-31 ASSESSMENT — FIBROSIS 4 INDEX: FIB4 SCORE: 1.02

## 2021-08-31 NOTE — ASSESSMENT & PLAN NOTE
Pt admits to left inner thigh/groin rash x 2 weeks. Pt unsure if worsening. Has been using Lotrimin OTC x 2-3 days. No itching or pain.

## 2021-08-31 NOTE — PATIENT INSTRUCTIONS
Jock Itch    Jock itch is an itchy rash in the groin and upper thigh area. It is a skin infection that is caused by a type of germ that lives in dark, damp places (fungus). The rash usually goes away in 2-3 weeks with treatment.  Follow these instructions at home:  Skin care  · Use skin creams, ointments, or powders exactly as told by your doctor.  · Wear loose-fitting clothes. Clothes should not rub against your groin area. Men should wear boxer shorts or loose-fitting underwear.  · Keep your groin area clean and dry.  ? Change your underwear every day.  ? Change out of wet bathing suits as soon as you can.  ? After bathing, use a separate towel to dry your groin area. Dry the area gently and completely.  · Avoid hot baths and showers. Hot water can make itching worse.  · Do not scratch the area.  General instructions  · Take and apply over-the-counter and prescription medicines only as told by your doctor.  · Do not share towels or clothing with other people.  · Wash your hands often with soap and water, especially after touching your groin area. If you do not have soap and water, use alcohol-based hand .  Contact a doctor if:  · Your rash:  ? Gets worse.  ? Does not get better after 2 weeks of treatment.  ? Spreads.  ? Comes back after treatment is done.  · You have any of the following:  ? A fever.  ? New or worsening redness, swelling, or pain around your rash.  ? Fluid, blood, or pus coming from your rash.  Summary  · Jock itch is an itchy rash. It affects the groin and upper thigh area.  · Jock itch usually goes away in 2-3 weeks with treatment.  · Keep your groin area clean and dry.  This information is not intended to replace advice given to you by your health care provider. Make sure you discuss any questions you have with your health care provider.  Document Released: 03/14/2011 Document Revised: 11/30/2018 Document Reviewed: 11/28/2018  Elsevier Patient Education © 2020 Elsevier Inc.      Once  resulted, your lab/test/imaging results will show up automatically in your MyChart. Please wait for my interpretation and recommendations prior to viewing your results to avoid any unnecessary confusion or misinterpretation. I will address all of the lab values that I interpret as abnormal and message you accordingly on your MyChart. I will always send you a message on your labs even if they are normal. If you do not hear back from me within 5 business days after getting your labs drawn, then please send me a message on [email or web address] so I can obtain your labs (especially if you went to an outside lab - [facility], [facility], etc). If you have any additional questions or concerns beyond my interpretation of your results, please make an appointment with me to discuss in further detail.    Please only use the [employer] messaging system for questions regarding your most recent appointment or if advised to use otherwise (glucose or blood pressure reporting). If you have any new problems or concerns, you must make an appointment to discuss. This includes any referral requests.     Thank you,    Yocasta Parker PA-C (Baker)  Physician Assistant Certified  [facility]

## 2022-06-05 NOTE — PROGRESS NOTES
Subjective:     CC:    Chief Complaint   Patient presents with   • Establish Care   • Rash     inner thigh, no pain or discomfort       HISTORY OF THE PRESENT ILLNESS: Patient is a 26 y.o. male here to establish care and for annual physical.  Patient overall feeling well.  He has been working on getting weight has been lifting weights 4 to 5 days a week.  He is trying to increase his caloric intake.  He takes omega-3, creatine and MSM supplements.    Rash  Pt admits to left inner thigh/groin rash x 2 weeks. Pt unsure if worsening. Has been using Lotrimin OTC x 2-3 days. No itching or pain.    Depression Screening    Little interest or pleasure in doing things?  0 - not at all   Feeling down, depressed , or hopeless? 0 - not at all     Allergies:   Patient has no known allergies.  Current Outpatient Medications Ordered in Epic   Medication Sig Dispense Refill   • CREATINE PO Take  by mouth.     • Methylsulfonylmethane (MSM PO) Take  by mouth.     • Omega-3 Fatty Acids (FISH OIL) 1000 MG Cap capsule Take 1,000 mg by mouth every day.       No current [facility]-ordered facility-administered medications on file.     Past Medical History:   Diagnosis Date   • Elevated CPK 5/6/2021   • Myalgia 5/6/2021   • Sepsis (HCC) 5/6/2021   • Vaccination reaction 5/6/2021     History reviewed. No pertinent surgical history.  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     From [place], [place]    No  work    No blood transfusion    No tattoos     Family History   Problem Relation Age of Onset   • Hyperlipidemia Father    • Cancer Father 61        Non-Hodgkins in 2019   • Cancer Paternal Grandmother         breast cancer       Health  Maintenance/Screening  --Diet: balanced, has been increasing calories - trying to increase muscle mass  --Exercise: 4-5 days of lifting a week. No injuries.  --Sunscreen: no, no abnl moles  --STI Screening: refused   --HIV Screening: refused  --Immunizations: pt thinks he is up to date     ROS:   Gen: no fevers/chills, no changes in weight  Eyes: no changes in vision  ENT: no sore throat, no hearing loss, no bloody nose  Pulm: no sob, no cough  CV: no chest pain, no palpitations  GI: no nausea/vomiting, no diarrhea  : no dysuria or hematuria  MSk: no myalgias  Skin: no rash  Neuro: no headaches, no numbness/tingling  Heme/Lymph: no easy bruising      Objective:     Exam: /78 (BP Location: Left arm, Patient Position: Sitting, BP Cuff Size: Adult)   Pulse 75   Temp 37.2 °C (99 °F) (Temporal)   Ht 1.829 m (6')   Wt 84.8 kg (187 lb)   SpO2 95%  Body mass index is 25.36 kg/m².  General: Normal appearing. No distress.  HEENT: Normocephalic. Eyes conjunctiva clear lids without ptosis, pupils equal.  Neck: Supple without JVD or bruit. Thyroid is not enlarged.  Pulmonary: Clear to ausculation.  Normal effort. No rales, ronchi, or wheezing.  Cardiovascular: Regular rate and rhythm without murmur. Carotid and radial pulses are intact and equal bilaterally.  Abdomen: Soft, nontender, nondistended. Normal bowel sounds. Liver and spleen are not palpable  Neurologic: Grossly nonfocal  Lymph: No cervical or supraclavicular lymph nodes are palpable  Skin: Warm and dry.  Left proximal medial thigh with 4 round mildly erythematous lesions with central clearing.  Mild scaling.  Musculoskeletal: Normal gait. No extremity cyanosis, clubbing, or edema.  Psych: Normal mood and affect. Alert and oriented x3. Judgment and insight is normal.    Assessment & Plan:   26 y.o. male with the following -    1. Wellness examination  Pleasant 26-year-old male here for annual physical.  History reviewed.  Vaccinations discussed.  Patient  will check records on second dose of varicella and hepatitis A vaccines.  Annual influenza vaccine recommended.    2. Tinea cruris  Continue using Lotrimin cream over-the-counter.  If no change after 1 week would recommend reevaluation.    3. Dietary counseling  4. Exercise counseling  Please continue working on lifestyle modifications. This includes accumulation of 150 minutes or more of moderate-intensity activity each week as tolerated and a healthy diet that is low in saturated fat, sodium, and cholesterol, such as the mediterranean diet that is typically high in fruits, vegetables, whole grains, beans, nuts, and seeds, includes olive oil as an important source of monounsaturated fat, DASH diet, and/or also consider a plant-based diet.    5. Screening for depression  PHQ0    Return if symptoms worsen or fail to improve.    Yocasta Parker PA-C (Baker)  Physician Assistant Certified  [facility]    Please note that this dictation was created using voice recognition software. I have made every reasonable attempt to correct obvious errors, but I expect that there are errors of grammar and possibly content that I did not discover before finalizing the note.   The wound was explored to base in bloodless field./No foreign body

## 2022-07-25 ENCOUNTER — OFFICE VISIT (OUTPATIENT)
Dept: MEDICAL GROUP | Facility: IMAGING CENTER | Age: 27
End: 2022-07-25
Payer: COMMERCIAL

## 2022-07-25 ENCOUNTER — HOSPITAL ENCOUNTER (OUTPATIENT)
Dept: LAB | Facility: MEDICAL CENTER | Age: 27
End: 2022-07-25
Attending: PHYSICIAN ASSISTANT
Payer: COMMERCIAL

## 2022-07-25 VITALS
HEIGHT: 72 IN | SYSTOLIC BLOOD PRESSURE: 110 MMHG | HEART RATE: 88 BPM | TEMPERATURE: 97.8 F | OXYGEN SATURATION: 98 % | BODY MASS INDEX: 28.25 KG/M2 | DIASTOLIC BLOOD PRESSURE: 80 MMHG | RESPIRATION RATE: 19 BRPM | WEIGHT: 208.6 LBS

## 2022-07-25 DIAGNOSIS — R11.0 NAUSEA: ICD-10-CM

## 2022-07-25 DIAGNOSIS — R19.7 DIARRHEA, UNSPECIFIED TYPE: ICD-10-CM

## 2022-07-25 DIAGNOSIS — R53.83 OTHER FATIGUE: ICD-10-CM

## 2022-07-25 LAB
ALBUMIN SERPL BCP-MCNC: 4.3 G/DL (ref 3.2–4.9)
ALBUMIN/GLOB SERPL: 1.4 G/DL
ALP SERPL-CCNC: 107 U/L (ref 30–99)
ALT SERPL-CCNC: 25 U/L (ref 2–50)
AMYLASE SERPL-CCNC: 52 U/L (ref 25–125)
ANION GAP SERPL CALC-SCNC: 13 MMOL/L (ref 7–16)
AST SERPL-CCNC: 21 U/L (ref 12–45)
BASOPHILS # BLD AUTO: 0 % (ref 0–1.8)
BASOPHILS # BLD: 0 K/UL (ref 0–0.12)
BILIRUB SERPL-MCNC: 0.4 MG/DL (ref 0.1–1.5)
BUN SERPL-MCNC: 17 MG/DL (ref 8–22)
CALCIUM SERPL-MCNC: 9.1 MG/DL (ref 8.4–10.2)
CHLORIDE SERPL-SCNC: 101 MMOL/L (ref 96–112)
CO2 SERPL-SCNC: 25 MMOL/L (ref 20–33)
CREAT SERPL-MCNC: 0.87 MG/DL (ref 0.5–1.4)
CRP SERPL HS-MCNC: 0.35 MG/DL (ref 0–0.75)
EOSINOPHIL # BLD AUTO: 0.26 K/UL (ref 0–0.51)
EOSINOPHIL NFR BLD: 4 % (ref 0–6.9)
ERYTHROCYTE [DISTWIDTH] IN BLOOD BY AUTOMATED COUNT: 35 FL (ref 35.9–50)
ERYTHROCYTE [SEDIMENTATION RATE] IN BLOOD BY WESTERGREN METHOD: 14 MM/HOUR (ref 0–20)
GFR SERPLBLD CREATININE-BSD FMLA CKD-EPI: 121 ML/MIN/1.73 M 2
GLOBULIN SER CALC-MCNC: 3.1 G/DL (ref 1.9–3.5)
GLUCOSE SERPL-MCNC: 91 MG/DL (ref 65–99)
HCT VFR BLD AUTO: 43.7 % (ref 42–52)
HGB BLD-MCNC: 15.5 G/DL (ref 14–18)
LIPASE SERPL-CCNC: 48 U/L (ref 7–58)
LYMPHOCYTES # BLD AUTO: 2.57 K/UL (ref 1–4.8)
LYMPHOCYTES NFR BLD: 39 % (ref 22–41)
MANUAL DIFF BLD: NORMAL
MCH RBC QN AUTO: 30.6 PG (ref 27–33)
MCHC RBC AUTO-ENTMCNC: 35.5 G/DL (ref 33.7–35.3)
MCV RBC AUTO: 86.2 FL (ref 81.4–97.8)
MONOCYTES # BLD AUTO: 1.19 K/UL (ref 0–0.85)
MONOCYTES NFR BLD AUTO: 18 % (ref 0–13.4)
NEUTROPHILS # BLD AUTO: 2.57 K/UL (ref 1.82–7.42)
NEUTROPHILS NFR BLD: 39 % (ref 44–72)
NRBC # BLD AUTO: 0 K/UL
NRBC BLD-RTO: 0 /100 WBC
PLATELET # BLD AUTO: 234 K/UL (ref 164–446)
PLATELET BLD QL SMEAR: NORMAL
PMV BLD AUTO: 10.3 FL (ref 9–12.9)
POTASSIUM SERPL-SCNC: 3.6 MMOL/L (ref 3.6–5.5)
PROT SERPL-MCNC: 7.4 G/DL (ref 6–8.2)
RBC # BLD AUTO: 5.07 M/UL (ref 4.7–6.1)
RBC BLD AUTO: NORMAL
SODIUM SERPL-SCNC: 139 MMOL/L (ref 135–145)
TSH SERPL DL<=0.005 MIU/L-ACNC: 1.38 UIU/ML (ref 0.38–5.33)
WBC # BLD AUTO: 6.6 K/UL (ref 4.8–10.8)

## 2022-07-25 PROCEDURE — 80074 ACUTE HEPATITIS PANEL: CPT

## 2022-07-25 PROCEDURE — 85007 BL SMEAR W/DIFF WBC COUNT: CPT

## 2022-07-25 PROCEDURE — 86140 C-REACTIVE PROTEIN: CPT

## 2022-07-25 PROCEDURE — 83690 ASSAY OF LIPASE: CPT

## 2022-07-25 PROCEDURE — 84443 ASSAY THYROID STIM HORMONE: CPT

## 2022-07-25 PROCEDURE — 36415 COLL VENOUS BLD VENIPUNCTURE: CPT

## 2022-07-25 PROCEDURE — 99214 OFFICE O/P EST MOD 30 MIN: CPT | Performed by: PHYSICIAN ASSISTANT

## 2022-07-25 PROCEDURE — 85025 COMPLETE CBC W/AUTO DIFF WBC: CPT

## 2022-07-25 PROCEDURE — 85652 RBC SED RATE AUTOMATED: CPT

## 2022-07-25 PROCEDURE — 82150 ASSAY OF AMYLASE: CPT

## 2022-07-25 PROCEDURE — 80053 COMPREHEN METABOLIC PANEL: CPT

## 2022-07-25 ASSESSMENT — FIBROSIS 4 INDEX: FIB4 SCORE: 0.5

## 2022-07-25 NOTE — PROGRESS NOTES
Subjective:     CC:   Chief Complaint   Patient presents with   • Diarrhea   • Fatigue       HPI:   Cesar presents today to discuss:    Diarrhea  Pt admits to fever, diarrhea, and vomiting x 1 week.  He states his symptoms started after eating chicken with cream sauce that was sitting in his fridge for about 5 days.  The fever resolved after 48 hours, then the vomiting. He still gets occasional nausea but overall is able to keep food down.  Admits to improving diarrhea.  Previously was having up to 14 episodes a day, this past weekend it was down to 7 episodes and he has had no episodes yet today.  During the diarrhea episodes he does get mild abdominal cramping.  No mucus or blood. Tmax was 101, chills, body aches that first day.  No URI symptoms.  No recent travel.  No sick contacts.    Fatigue  Patient admits to significant fatigue since recent illness.      Past Medical History:   Diagnosis Date   • Elevated CPK 5/6/2021   • Myalgia 5/6/2021   • Sepsis (HCC) 5/6/2021   • Vaccination reaction 5/6/2021     Family History   Problem Relation Age of Onset   • Hyperlipidemia Father    • Cancer Father 61        Non-Hodgkins in 2019   • Cancer Paternal Grandmother         breast cancer     History reviewed. No pertinent surgical history.  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     From [place], [place]    No  work    No blood transfusion    No tattoos     Current Outpatient Medications Ordered in Epic   Medication Sig Dispense Refill   • CREATINE PO Take  by mouth.     • Methylsulfonylmethane (MSM PO) Take  by mouth.       No current Epic-ordered facility-administered medications on file.     Patient has no known  allergies.    ROS: see hpi  Gen: no fevers/chills  Pulm: no sob, no cough  CV: no chest pain, no palpitations, no edema  GI: no nausea/vomiting, no diarrhea  Skin: no rash    Objective:   Exam:  /80 (BP Location: Left arm, Patient Position: Sitting, BP Cuff Size: Adult)   Pulse 88   Temp 36.6 °C (97.8 °F) (Skin)   Resp 19   Ht 1.829 m (6')   Wt 94.6 kg (208 lb 9.6 oz)   SpO2 98%   BMI 28.29 kg/m²    Body mass index is 28.29 kg/m².    Gen: Alert and oriented, No apparent distress.  HEENT: Head atraumatic, normocephalic. Pupils equal and round.  Neck: Neck is supple without lymphadenopathy.   Lungs: Normal effort, CTA bilaterally, no wheezes, rhonchi, or rales  CV: Regular rate and rhythm. No murmurs, rubs, or gallops.  ABD: +BS. Non-tender, non-distended. No rebound, rigidity, or guarding.  Ext: No clubbing, cyanosis, edema.    Assessment & Plan:     26 y.o. male with the following -     1. Diarrhea, unspecified type  Acute, improving.  Suspect infectious etiology related to old chicken with cream sauce.  Will check testing as listed below.  If negative will order abdominal ultrasound and GI evaluation.  Increase fluids and electrolytes.  - CULTURE STOOL; Future  - CRYPTO/GIARDIA RAPID ASSAY; Future  - CALPROTECTIN,FECAL; Future  - FECAL LACTOFERRIN QUALITATIVE; Future  - CBC WITH DIFFERENTIAL; Future  - Comp Metabolic Panel; Future  - TSH WITH REFLEX TO FT4; Future  - CRP QUANTITIVE (NON-CARDIAC); Future  - Sed Rate; Future  - AMYLASE; Future  - LIPASE; Future  - HEPATITIS PANEL ACUTE(4 COMPONENTS); Future  - OCCULT BLOOD STOOL; Future    2. Nausea  Improved, brat diet, increase fluids.  - CULTURE STOOL; Future  - CRYPTO/GIARDIA RAPID ASSAY; Future  - CALPROTECTIN,FECAL; Future  - FECAL LACTOFERRIN QUALITATIVE; Future  - CBC WITH DIFFERENTIAL; Future  - Comp Metabolic Panel; Future  - TSH WITH REFLEX TO FT4; Future  - CRP QUANTITIVE (NON-CARDIAC); Future  - Sed Rate; Future  - AMYLASE; Future  - LIPASE;  Future  - US-ABDOMEN COMPLETE SURVEY; Future  - HEPATITIS PANEL ACUTE(4 COMPONENTS); Future  - OCCULT BLOOD STOOL; Future    3. Other fatigue  - CULTURE STOOL; Future  - CRYPTO/GIARDIA RAPID ASSAY; Future  - CALPROTECTIN,FECAL; Future  - FECAL LACTOFERRIN QUALITATIVE; Future  - CBC WITH DIFFERENTIAL; Future  - Comp Metabolic Panel; Future  - TSH WITH REFLEX TO FT4; Future  - CRP QUANTITIVE (NON-CARDIAC); Future  - Sed Rate; Future  - AMYLASE; Future  - LIPASE; Future  - US-ABDOMEN COMPLETE SURVEY; Future  - HEPATITIS PANEL ACUTE(4 COMPONENTS); Future  - OCCULT BLOOD STOOL; Future    Return if symptoms worsen or fail to improve, for Will notify patient to follow-up pending tests.    Yocasta Parker PA-C (Baker)  Physician Assistant Certified  [facility]    Please note that this dictation was created using voice recognition software. I have made every reasonable attempt to correct obvious errors, but I expect that there are errors of grammar and possibly content that I did not discover before finalizing the note.

## 2022-07-25 NOTE — ASSESSMENT & PLAN NOTE
Pt admits to fever, diarrhea, and vomiting x 1 week.  He states his symptoms started after eating chicken with cream sauce that was sitting in his fridge for about 5 days.  The fever resolved after 48 hours, then the vomiting. He still gets occasional nausea but overall is able to keep food down.  Admits to improving diarrhea.  Previously was having up to 14 episodes a day, this past weekend it was down to 7 episodes and he has had no episodes yet today.  During the diarrhea episodes he does get mild abdominal cramping.  No mucus or blood. Tmax was 101, chills, body aches that first day.  No URI symptoms.  No recent travel.  No sick contacts.

## 2022-07-26 LAB
HAV IGM SERPL QL IA: NORMAL
HBV CORE IGM SER QL: NORMAL
HBV SURFACE AG SER QL: NORMAL
HCV AB SER QL: NORMAL

## 2022-07-26 NOTE — PATIENT INSTRUCTIONS
It was a pleasure meeting with you today at [facility]!    Your medical history/records and medications were reviewed today.     Please review my practice information below:    If you have any prescription refill requests, please send them via [employer] or discuss with your provider at the start of your office visits. Please allow 3-5 business days for lab and testing review and you will be contacted via [employer]) with those results, or if advised to make a follow up appointment regarding those results, then please do so.     Once resulted, your lab/test/imaging results will show up automatically in your MyChart. Please wait for my interpretation and recommendations prior to viewing your results to avoid any unnecessary confusion or misinterpretation. I will address all of the lab values that I interpret as abnormal and message you accordingly on your MyChart. I will always send you a message about your results even if they are normal. If you do not hear back from me within 5-7 business days after completing your tests, then please send me a message on [employer] so I can obtain your results (especially if you went to an outside lab or imaging center - [facility], [facility], etc).     If you have any additional questions or concerns beyond my interpretation of your results, please make an appointment with me to discuss in further detail.    Please only use the [employer]) messaging system for questions regarding your most recent appointment or if advised to use otherwise (glucose or blood pressure reporting).     If you have any new problems or concerns, you must make an appointment to discuss. This includes any referral requests, lab requests (unless advised to notify me for pre-appt labs), medication side effects, or request for medication adjustments.     Please arrive 15 minutes prior to your appointment time to complete your check-in and intake with the medical assistant.      Thank you,    Yocasta Cramer) Keith  OSMIN  Physician Assistant Certified  [facility]

## 2022-07-27 ENCOUNTER — HOSPITAL ENCOUNTER (OUTPATIENT)
Facility: MEDICAL CENTER | Age: 27
End: 2022-07-27
Attending: PHYSICIAN ASSISTANT
Payer: COMMERCIAL

## 2022-07-27 DIAGNOSIS — R53.83 OTHER FATIGUE: ICD-10-CM

## 2022-07-27 DIAGNOSIS — R19.7 DIARRHEA, UNSPECIFIED TYPE: ICD-10-CM

## 2022-07-27 DIAGNOSIS — R11.0 NAUSEA: ICD-10-CM

## 2022-07-27 LAB
G LAMBLIA+C PARVUM AG STL QL RAPID: NORMAL
SIGNIFICANT IND 70042: NORMAL
SITE SITE: NORMAL
SOURCE SOURCE: NORMAL

## 2022-07-27 PROCEDURE — 87045 FECES CULTURE AEROBIC BACT: CPT

## 2022-07-27 PROCEDURE — 87899 AGENT NOS ASSAY W/OPTIC: CPT | Mod: 91

## 2022-07-27 PROCEDURE — 87329 GIARDIA AG IA: CPT

## 2022-07-27 PROCEDURE — 87328 CRYPTOSPORIDIUM AG IA: CPT

## 2022-07-27 PROCEDURE — 83630 LACTOFERRIN FECAL (QUAL): CPT

## 2022-07-27 PROCEDURE — 87077 CULTURE AEROBIC IDENTIFY: CPT

## 2022-07-27 PROCEDURE — 83993 ASSAY FOR CALPROTECTIN FECAL: CPT

## 2022-07-28 LAB
E COLI SXT1+2 STL IA: NORMAL
SIGNIFICANT IND 70042: NORMAL
SITE SITE: NORMAL
SOURCE SOURCE: NORMAL

## 2022-07-29 LAB
BACTERIA STL CULT: NORMAL
C JEJUNI+C COLI AG STL QL: NORMAL
E COLI SXT1+2 STL IA: NORMAL
LACTOFERRIN STL QL IA: NEGATIVE
SIGNIFICANT IND 70042: NORMAL
SITE SITE: NORMAL
SOURCE SOURCE: NORMAL

## 2022-07-30 LAB — CALPROTECTIN STL-MCNT: 5 UG/G

## 2023-07-02 SDOH — ECONOMIC STABILITY: INCOME INSECURITY: IN THE LAST 12 MONTHS, WAS THERE A TIME WHEN YOU WERE NOT ABLE TO PAY THE MORTGAGE OR RENT ON TIME?: NO

## 2023-07-02 SDOH — ECONOMIC STABILITY: TRANSPORTATION INSECURITY
IN THE PAST 12 MONTHS, HAS LACK OF TRANSPORTATION KEPT YOU FROM MEETINGS, WORK, OR FROM GETTING THINGS NEEDED FOR DAILY LIVING?: NO

## 2023-07-02 SDOH — ECONOMIC STABILITY: HOUSING INSECURITY
IN THE LAST 12 MONTHS, WAS THERE A TIME WHEN YOU DID NOT HAVE A STEADY PLACE TO SLEEP OR SLEPT IN A SHELTER (INCLUDING NOW)?: NO

## 2023-07-02 SDOH — ECONOMIC STABILITY: INCOME INSECURITY: HOW HARD IS IT FOR YOU TO PAY FOR THE VERY BASICS LIKE FOOD, HOUSING, MEDICAL CARE, AND HEATING?: NOT VERY HARD

## 2023-07-02 SDOH — ECONOMIC STABILITY: TRANSPORTATION INSECURITY
IN THE PAST 12 MONTHS, HAS THE LACK OF TRANSPORTATION KEPT YOU FROM MEDICAL APPOINTMENTS OR FROM GETTING MEDICATIONS?: NO

## 2023-07-02 SDOH — HEALTH STABILITY: PHYSICAL HEALTH: ON AVERAGE, HOW MANY DAYS PER WEEK DO YOU ENGAGE IN MODERATE TO STRENUOUS EXERCISE (LIKE A BRISK WALK)?: 3 DAYS

## 2023-07-02 SDOH — ECONOMIC STABILITY: FOOD INSECURITY: WITHIN THE PAST 12 MONTHS, THE FOOD YOU BOUGHT JUST DIDN'T LAST AND YOU DIDN'T HAVE MONEY TO GET MORE.: NEVER TRUE

## 2023-07-02 SDOH — HEALTH STABILITY: MENTAL HEALTH
STRESS IS WHEN SOMEONE FEELS TENSE, NERVOUS, ANXIOUS, OR CAN'T SLEEP AT NIGHT BECAUSE THEIR MIND IS TROUBLED. HOW STRESSED ARE YOU?: NOT AT ALL

## 2023-07-02 SDOH — HEALTH STABILITY: PHYSICAL HEALTH: ON AVERAGE, HOW MANY MINUTES DO YOU ENGAGE IN EXERCISE AT THIS LEVEL?: 120 MIN

## 2023-07-02 SDOH — ECONOMIC STABILITY: FOOD INSECURITY: WITHIN THE PAST 12 MONTHS, YOU WORRIED THAT YOUR FOOD WOULD RUN OUT BEFORE YOU GOT MONEY TO BUY MORE.: NEVER TRUE

## 2023-07-02 SDOH — ECONOMIC STABILITY: HOUSING INSECURITY: IN THE LAST 12 MONTHS, HOW MANY PLACES HAVE YOU LIVED?: 2

## 2023-07-02 SDOH — ECONOMIC STABILITY: TRANSPORTATION INSECURITY
IN THE PAST 12 MONTHS, HAS LACK OF RELIABLE TRANSPORTATION KEPT YOU FROM MEDICAL APPOINTMENTS, MEETINGS, WORK OR FROM GETTING THINGS NEEDED FOR DAILY LIVING?: NO

## 2023-07-02 ASSESSMENT — SOCIAL DETERMINANTS OF HEALTH (SDOH)
ARE YOU MARRIED, WIDOWED, DIVORCED, SEPARATED, NEVER MARRIED, OR LIVING WITH A PARTNER?: NEVER MARRIED
HOW OFTEN DO YOU ATTEND CHURCH OR RELIGIOUS SERVICES?: NEVER
HOW OFTEN DO YOU ATTEND CHURCH OR RELIGIOUS SERVICES?: NEVER
HOW OFTEN DO YOU GET TOGETHER WITH FRIENDS OR RELATIVES?: ONCE A WEEK
IN A TYPICAL WEEK, HOW MANY TIMES DO YOU TALK ON THE PHONE WITH FAMILY, FRIENDS, OR NEIGHBORS?: THREE TIMES A WEEK
HOW OFTEN DO YOU GET TOGETHER WITH FRIENDS OR RELATIVES?: ONCE A WEEK
HOW OFTEN DO YOU ATTENT MEETINGS OF THE CLUB OR ORGANIZATION YOU BELONG TO?: NEVER
DO YOU BELONG TO ANY CLUBS OR ORGANIZATIONS SUCH AS CHURCH GROUPS UNIONS, FRATERNAL OR ATHLETIC GROUPS, OR SCHOOL GROUPS?: NO
HOW MANY DRINKS CONTAINING ALCOHOL DO YOU HAVE ON A TYPICAL DAY WHEN YOU ARE DRINKING: 1 OR 2
DO YOU BELONG TO ANY CLUBS OR ORGANIZATIONS SUCH AS CHURCH GROUPS UNIONS, FRATERNAL OR ATHLETIC GROUPS, OR SCHOOL GROUPS?: NO
HOW OFTEN DO YOU HAVE SIX OR MORE DRINKS ON ONE OCCASION: LESS THAN MONTHLY
WITHIN THE PAST 12 MONTHS, YOU WORRIED THAT YOUR FOOD WOULD RUN OUT BEFORE YOU GOT THE MONEY TO BUY MORE: NEVER TRUE
IN A TYPICAL WEEK, HOW MANY TIMES DO YOU TALK ON THE PHONE WITH FAMILY, FRIENDS, OR NEIGHBORS?: THREE TIMES A WEEK
HOW HARD IS IT FOR YOU TO PAY FOR THE VERY BASICS LIKE FOOD, HOUSING, MEDICAL CARE, AND HEATING?: NOT VERY HARD
HOW OFTEN DO YOU HAVE A DRINK CONTAINING ALCOHOL: MONTHLY OR LESS
HOW OFTEN DO YOU ATTENT MEETINGS OF THE CLUB OR ORGANIZATION YOU BELONG TO?: NEVER
ARE YOU MARRIED, WIDOWED, DIVORCED, SEPARATED, NEVER MARRIED, OR LIVING WITH A PARTNER?: NEVER MARRIED

## 2023-07-02 ASSESSMENT — LIFESTYLE VARIABLES
HOW OFTEN DO YOU HAVE A DRINK CONTAINING ALCOHOL: MONTHLY OR LESS
HOW MANY STANDARD DRINKS CONTAINING ALCOHOL DO YOU HAVE ON A TYPICAL DAY: 1 OR 2
AUDIT-C TOTAL SCORE: 2
SKIP TO QUESTIONS 9-10: 0
HOW OFTEN DO YOU HAVE SIX OR MORE DRINKS ON ONE OCCASION: LESS THAN MONTHLY

## 2023-07-03 ENCOUNTER — HOSPITAL ENCOUNTER (OUTPATIENT)
Dept: LAB | Facility: MEDICAL CENTER | Age: 28
End: 2023-07-03
Attending: PHYSICIAN ASSISTANT
Payer: COMMERCIAL

## 2023-07-03 ENCOUNTER — OFFICE VISIT (OUTPATIENT)
Dept: MEDICAL GROUP | Facility: IMAGING CENTER | Age: 28
End: 2023-07-03
Payer: COMMERCIAL

## 2023-07-03 VITALS
HEIGHT: 72 IN | SYSTOLIC BLOOD PRESSURE: 132 MMHG | DIASTOLIC BLOOD PRESSURE: 60 MMHG | BODY MASS INDEX: 26.55 KG/M2 | HEART RATE: 92 BPM | WEIGHT: 196 LBS | OXYGEN SATURATION: 98 % | TEMPERATURE: 97 F

## 2023-07-03 DIAGNOSIS — R10.12 LUQ PAIN: ICD-10-CM

## 2023-07-03 LAB
ALBUMIN SERPL BCP-MCNC: 4.5 G/DL (ref 3.2–4.9)
ALBUMIN/GLOB SERPL: 1.6 G/DL
ALP SERPL-CCNC: 108 U/L (ref 30–99)
ALT SERPL-CCNC: 25 U/L (ref 2–50)
AMYLASE SERPL-CCNC: 61 U/L (ref 20–103)
ANION GAP SERPL CALC-SCNC: 13 MMOL/L (ref 7–16)
AST SERPL-CCNC: 20 U/L (ref 12–45)
BASOPHILS # BLD AUTO: 0.5 % (ref 0–1.8)
BASOPHILS # BLD: 0.04 K/UL (ref 0–0.12)
BILIRUB SERPL-MCNC: 0.4 MG/DL (ref 0.1–1.5)
BUN SERPL-MCNC: 17 MG/DL (ref 8–22)
CALCIUM ALBUM COR SERPL-MCNC: 8.8 MG/DL (ref 8.5–10.5)
CALCIUM SERPL-MCNC: 9.2 MG/DL (ref 8.5–10.5)
CHLORIDE SERPL-SCNC: 105 MMOL/L (ref 96–112)
CO2 SERPL-SCNC: 25 MMOL/L (ref 20–33)
CREAT SERPL-MCNC: 1.08 MG/DL (ref 0.5–1.4)
EOSINOPHIL # BLD AUTO: 0.12 K/UL (ref 0–0.51)
EOSINOPHIL NFR BLD: 1.4 % (ref 0–6.9)
ERYTHROCYTE [DISTWIDTH] IN BLOOD BY AUTOMATED COUNT: 37.2 FL (ref 35.9–50)
GFR SERPLBLD CREATININE-BSD FMLA CKD-EPI: 96 ML/MIN/1.73 M 2
GLOBULIN SER CALC-MCNC: 2.8 G/DL (ref 1.9–3.5)
GLUCOSE SERPL-MCNC: 95 MG/DL (ref 65–99)
HCT VFR BLD AUTO: 46 % (ref 42–52)
HGB BLD-MCNC: 16.1 G/DL (ref 14–18)
IMM GRANULOCYTES # BLD AUTO: 0.03 K/UL (ref 0–0.11)
IMM GRANULOCYTES NFR BLD AUTO: 0.3 % (ref 0–0.9)
LIPASE SERPL-CCNC: 41 U/L (ref 11–82)
LYMPHOCYTES # BLD AUTO: 2.14 K/UL (ref 1–4.8)
LYMPHOCYTES NFR BLD: 24.2 % (ref 22–41)
MCH RBC QN AUTO: 30.8 PG (ref 27–33)
MCHC RBC AUTO-ENTMCNC: 35 G/DL (ref 32.3–36.5)
MCV RBC AUTO: 88 FL (ref 81.4–97.8)
MONOCYTES # BLD AUTO: 0.65 K/UL (ref 0–0.85)
MONOCYTES NFR BLD AUTO: 7.3 % (ref 0–13.4)
NEUTROPHILS # BLD AUTO: 5.88 K/UL (ref 1.82–7.42)
NEUTROPHILS NFR BLD: 66.3 % (ref 44–72)
NRBC # BLD AUTO: 0 K/UL
NRBC BLD-RTO: 0 /100 WBC (ref 0–0.2)
PLATELET # BLD AUTO: 194 K/UL (ref 164–446)
PMV BLD AUTO: 11.5 FL (ref 9–12.9)
POTASSIUM SERPL-SCNC: 4.1 MMOL/L (ref 3.6–5.5)
PROT SERPL-MCNC: 7.3 G/DL (ref 6–8.2)
RBC # BLD AUTO: 5.23 M/UL (ref 4.7–6.1)
SODIUM SERPL-SCNC: 143 MMOL/L (ref 135–145)
WBC # BLD AUTO: 8.9 K/UL (ref 4.8–10.8)

## 2023-07-03 PROCEDURE — 83690 ASSAY OF LIPASE: CPT

## 2023-07-03 PROCEDURE — 36415 COLL VENOUS BLD VENIPUNCTURE: CPT

## 2023-07-03 PROCEDURE — 3078F DIAST BP <80 MM HG: CPT | Performed by: PHYSICIAN ASSISTANT

## 2023-07-03 PROCEDURE — 82150 ASSAY OF AMYLASE: CPT

## 2023-07-03 PROCEDURE — 80053 COMPREHEN METABOLIC PANEL: CPT

## 2023-07-03 PROCEDURE — 99213 OFFICE O/P EST LOW 20 MIN: CPT | Performed by: PHYSICIAN ASSISTANT

## 2023-07-03 PROCEDURE — 85025 COMPLETE CBC W/AUTO DIFF WBC: CPT

## 2023-07-03 PROCEDURE — 3075F SYST BP GE 130 - 139MM HG: CPT | Performed by: PHYSICIAN ASSISTANT

## 2023-07-03 ASSESSMENT — FIBROSIS 4 INDEX: FIB4 SCORE: 0.48

## 2023-07-03 ASSESSMENT — PATIENT HEALTH QUESTIONNAIRE - PHQ9: CLINICAL INTERPRETATION OF PHQ2 SCORE: 0

## 2023-07-03 NOTE — PROGRESS NOTES
Subjective:     CC:   Chief Complaint   Patient presents with    Abdominal Pain     LUQ       HPI:   Cesar presents today to discuss:    LUQ pain  Pt admits to LUQ pain x 1 week. Pain is constant, but improving over the past 3 days. Described as a dull pain with tenderness when he presses along that area. He thinks he may have pulled a muscle when getting out of the water when on vacation two weeks ago. No N/V/D/C. No recent URI. No fever or chills. No injuries. No SOB or cough.       Past Medical History:   Diagnosis Date    Elevated CPK 5/6/2021    Myalgia 5/6/2021    Sepsis (HCC) 5/6/2021    Vaccination reaction 5/6/2021     Family History   Problem Relation Age of Onset    Hyperlipidemia Father     Cancer Father 61        Non-Hodgkins in 2019    Cancer Paternal Grandmother         breast cancer     History reviewed. No pertinent surgical history.  Social History     Tobacco Use    Smoking status: Never    Smokeless tobacco: Never   Vaping Use    Vaping Use: Never used   Substance Use Topics    Alcohol use: Never    Drug use: Never     Social History     Social History Narrative    Works for [employer]. 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     From [place], [place]    No  work    No blood transfusion    No tattoos     No current Epic-ordered outpatient medications on file.     No current [employer]-ordered facility-administered medications on file.     Patient has no known allergies.    PMH/PSH/FH/Social history reviewed.  Vaccinations discussed.  Previous records and labs reviewed. Discussed age appropriate anticipatory guidance.    ROS: see hpi  Gen: no fevers/chills  Pulm: no sob, no cough  CV: no chest pain, no palpitations, no edema  GI: no nausea/vomiting, no diarrhea  Skin: no rash    Objective:   Exam:  /60 (BP Location: Right arm, Patient Position: Sitting, BP Cuff Size: Adult long)   Pulse 92   Temp 36.1 °C (97 °F) (Temporal)   Ht 1.829 m (6')   Wt 88.9 kg (196 lb)   SpO2 98%    BMI 26.58 kg/m²    Body mass index is 26.58 kg/m².    Gen: Alert and oriented, No apparent distress.  HEENT: Head atraumatic, normocephalic. Pupils equal and round.  Neck: Neck is supple without lymphadenopathy.   Lungs: Normal effort, CTA bilaterally, no wheezes, rhonchi, or rales  CV: Regular rate and rhythm. No murmurs, rubs, or gallops.  ABD: +BS.  Diffuse tenderness along left upper quadrant and left intercostal space of ninth and 10th ribs.  No skin changes or bruising.  Non-distended. No rebound, rigidity, or guarding.  No hepatosplenomegaly.  Ext: No clubbing, cyanosis, edema.    Assessment & Plan:     27 y.o. male with the following -     1. LUQ pain  Suspect musculoskeletal, although due to diffuse tenderness and length of symptoms, will rule out intra-abdominal process.  May trial ice alternating with heat and ibuprofen or tylenol. See orders below.  ER if symptoms worsen or if you develop fever, nausea, vomiting, melena, flank pain.  - US-ABDOMEN COMPLETE SURVEY; Future  - CBC WITH DIFFERENTIAL; Future  - Comp Metabolic Panel; Future  - AMYLASE; Future  - LIPASE; Future    Return for Will notify patient to follow-up pending tests.    Yocasta Parker PA-C (Baker)  Physician Assistant Certified  [facility]    Please note that this dictation was created using voice recognition software. I have made every reasonable attempt to correct obvious errors, but I expect that there are errors of grammar and possibly content that I did not discover before finalizing the note.

## 2023-07-03 NOTE — PATIENT INSTRUCTIONS
It was a pleasure meeting with you today at [facility]!    Your medical history/records and medications were reviewed today.     UPDATE on MyChart Results: If you have blood work, and/or imaging studies, or any other test or procedure completed, you will have access to results as soon as they become available in MyChart. Recently, these results will be available for review at the same time that your provider is able to see results!    This will likely mean you will see a result before your provider has had a chance to review and discuss with you.  Some results or care notes may be hard to understand and may be serious in nature.    We look at every result and your provider will contact you to explain what they mean and discuss appropriate next steps. Please allow for at least 72 business hours for chart and result review.     We prefer that you wait for your care team to contact you with your results.  Often, your provider will discuss your results with you at your next appointment. We look forward to continuing to partner with you in your care.    Please review my practice information below:    If you have any prescription refill requests, please send them via [employer] or discuss with your provider at the start of your office visits. Please allow 3-5 business days for lab and testing review and you will be contacted via [employer] with those results, or if advised to make a follow up appointment regarding those results, then please do so.     Once resulted, your lab/test/imaging results will show up automatically in your MyChart. Please wait for my interpretation and recommendations prior to viewing your results to avoid any unnecessary confusion or misinterpretation. I will address all of the lab values that I interpret as abnormal and message you accordingly on your MyChart. I will always send you a message about your results even if they are normal. If you do not hear back from me within 5-7 business  days after completing your tests, then please send me a message on [employer] so I can obtain your results (especially if you went to an outside lab or imaging center - [facility], [facility], etc).     If you have any additional questions or concerns beyond my interpretation of your results, please make an appointment with me to discuss in further detail.    Please only use the [employer] messaging system for questions regarding your most recent appointment or if advised to use otherwise (glucose or blood pressure reporting).     If you have any new problems or concerns, you must make an appointment to discuss. This includes any referral requests, lab requests (unless advised to notify me for pre-appt labs), medication side effects, or request for medication adjustments.     Please arrive 15 minutes prior to your appointment time to complete your check-in and intake with the medical assistant.      Thank you,    Yocasta Parker PA-C (Baker)  Physician Assistant Certified  [facility]    -----------------------------------------------------------------    Attn: Patients of [facility]:    In an effort to continue to provide excellent and efficient care to our patients, it is vital that we continue to use our resources appropriately. With that, this is a reminder that [employer] is used for prescription refill requests, test results, virtual visits, and chart review only.     Any new questions, concerns/conditions, lab/imaging requests, medication adjustments, new prescriptions, or referral requests do require an appointment (virtually or in person), unless discussed otherwise at your most recent appointment.     Thank you for your understanding,    [facility]

## 2023-07-04 ENCOUNTER — HOSPITAL ENCOUNTER (OUTPATIENT)
Dept: RADIOLOGY | Facility: MEDICAL CENTER | Age: 28
End: 2023-07-04
Attending: PHYSICIAN ASSISTANT
Payer: COMMERCIAL

## 2023-07-04 DIAGNOSIS — R10.12 LUQ PAIN: ICD-10-CM

## 2023-07-04 PROCEDURE — 76700 US EXAM ABDOM COMPLETE: CPT

## 2024-09-19 SDOH — ECONOMIC STABILITY: INCOME INSECURITY: IN THE LAST 12 MONTHS, WAS THERE A TIME WHEN YOU WERE NOT ABLE TO PAY THE MORTGAGE OR RENT ON TIME?: NO

## 2024-09-19 SDOH — HEALTH STABILITY: PHYSICAL HEALTH: ON AVERAGE, HOW MANY DAYS PER WEEK DO YOU ENGAGE IN MODERATE TO STRENUOUS EXERCISE (LIKE A BRISK WALK)?: 4 DAYS

## 2024-09-19 SDOH — HEALTH STABILITY: PHYSICAL HEALTH: ON AVERAGE, HOW MANY MINUTES DO YOU ENGAGE IN EXERCISE AT THIS LEVEL?: 50 MIN

## 2024-09-19 SDOH — ECONOMIC STABILITY: FOOD INSECURITY: WITHIN THE PAST 12 MONTHS, THE FOOD YOU BOUGHT JUST DIDN'T LAST AND YOU DIDN'T HAVE MONEY TO GET MORE.: NEVER TRUE

## 2024-09-19 SDOH — ECONOMIC STABILITY: INCOME INSECURITY: HOW HARD IS IT FOR YOU TO PAY FOR THE VERY BASICS LIKE FOOD, HOUSING, MEDICAL CARE, AND HEATING?: NOT VERY HARD

## 2024-09-19 SDOH — ECONOMIC STABILITY: FOOD INSECURITY: WITHIN THE PAST 12 MONTHS, YOU WORRIED THAT YOUR FOOD WOULD RUN OUT BEFORE YOU GOT MONEY TO BUY MORE.: NEVER TRUE

## 2024-09-19 ASSESSMENT — SOCIAL DETERMINANTS OF HEALTH (SDOH)
HOW OFTEN DO YOU GET TOGETHER WITH FRIENDS OR RELATIVES?: ONCE A WEEK
ARE YOU MARRIED, WIDOWED, DIVORCED, SEPARATED, NEVER MARRIED, OR LIVING WITH A PARTNER?: NEVER MARRIED
HOW OFTEN DO YOU ATTENT MEETINGS OF THE CLUB OR ORGANIZATION YOU BELONG TO?: NEVER
IN THE PAST 12 MONTHS, HAS THE ELECTRIC, GAS, OIL, OR WATER COMPANY THREATENED TO SHUT OFF SERVICE IN YOUR HOME?: NO
ARE YOU MARRIED, WIDOWED, DIVORCED, SEPARATED, NEVER MARRIED, OR LIVING WITH A PARTNER?: NEVER MARRIED
HOW OFTEN DO YOU ATTEND CHURCH OR RELIGIOUS SERVICES?: NEVER
IN A TYPICAL WEEK, HOW MANY TIMES DO YOU TALK ON THE PHONE WITH FAMILY, FRIENDS, OR NEIGHBORS?: THREE TIMES A WEEK
HOW OFTEN DO YOU GET TOGETHER WITH FRIENDS OR RELATIVES?: ONCE A WEEK
HOW MANY DRINKS CONTAINING ALCOHOL DO YOU HAVE ON A TYPICAL DAY WHEN YOU ARE DRINKING: 1 OR 2
HOW OFTEN DO YOU HAVE A DRINK CONTAINING ALCOHOL: MONTHLY OR LESS
HOW OFTEN DO YOU ATTEND CHURCH OR RELIGIOUS SERVICES?: NEVER
IN A TYPICAL WEEK, HOW MANY TIMES DO YOU TALK ON THE PHONE WITH FAMILY, FRIENDS, OR NEIGHBORS?: THREE TIMES A WEEK
HOW HARD IS IT FOR YOU TO PAY FOR THE VERY BASICS LIKE FOOD, HOUSING, MEDICAL CARE, AND HEATING?: NOT VERY HARD
WITHIN THE PAST 12 MONTHS, YOU WORRIED THAT YOUR FOOD WOULD RUN OUT BEFORE YOU GOT THE MONEY TO BUY MORE: NEVER TRUE
DO YOU BELONG TO ANY CLUBS OR ORGANIZATIONS SUCH AS CHURCH GROUPS UNIONS, FRATERNAL OR ATHLETIC GROUPS, OR SCHOOL GROUPS?: NO
HOW OFTEN DO YOU ATTENT MEETINGS OF THE CLUB OR ORGANIZATION YOU BELONG TO?: NEVER
HOW OFTEN DO YOU HAVE SIX OR MORE DRINKS ON ONE OCCASION: LESS THAN MONTHLY
DO YOU BELONG TO ANY CLUBS OR ORGANIZATIONS SUCH AS CHURCH GROUPS UNIONS, FRATERNAL OR ATHLETIC GROUPS, OR SCHOOL GROUPS?: NO

## 2024-09-19 ASSESSMENT — LIFESTYLE VARIABLES
HOW OFTEN DO YOU HAVE A DRINK CONTAINING ALCOHOL: MONTHLY OR LESS
HOW OFTEN DO YOU HAVE SIX OR MORE DRINKS ON ONE OCCASION: LESS THAN MONTHLY
SKIP TO QUESTIONS 9-10: 0
AUDIT-C TOTAL SCORE: 2
HOW MANY STANDARD DRINKS CONTAINING ALCOHOL DO YOU HAVE ON A TYPICAL DAY: 1 OR 2

## 2024-09-20 ENCOUNTER — OFFICE VISIT (OUTPATIENT)
Dept: MEDICAL GROUP | Facility: IMAGING CENTER | Age: 29
End: 2024-09-20
Payer: COMMERCIAL

## 2024-09-20 ENCOUNTER — HOSPITAL ENCOUNTER (OUTPATIENT)
Dept: LAB | Facility: MEDICAL CENTER | Age: 29
End: 2024-09-20
Attending: PHYSICIAN ASSISTANT
Payer: COMMERCIAL

## 2024-09-20 VITALS
HEIGHT: 72 IN | HEART RATE: 67 BPM | DIASTOLIC BLOOD PRESSURE: 72 MMHG | TEMPERATURE: 97.7 F | BODY MASS INDEX: 27.44 KG/M2 | WEIGHT: 202.6 LBS | RESPIRATION RATE: 15 BRPM | OXYGEN SATURATION: 97 % | SYSTOLIC BLOOD PRESSURE: 124 MMHG

## 2024-09-20 DIAGNOSIS — Z13.220 SCREENING CHOLESTEROL LEVEL: ICD-10-CM

## 2024-09-20 DIAGNOSIS — Z00.00 WELLNESS EXAMINATION: ICD-10-CM

## 2024-09-20 DIAGNOSIS — Z13.0 SCREENING FOR DEFICIENCY ANEMIA: ICD-10-CM

## 2024-09-20 DIAGNOSIS — Z23 NEED FOR VACCINATION: ICD-10-CM

## 2024-09-20 DIAGNOSIS — Z13.1 DIABETES MELLITUS SCREENING: ICD-10-CM

## 2024-09-20 DIAGNOSIS — Z13.21 ENCOUNTER FOR VITAMIN DEFICIENCY SCREENING: ICD-10-CM

## 2024-09-20 DIAGNOSIS — Z13.79 GENETIC TESTING: ICD-10-CM

## 2024-09-20 PROBLEM — R53.83 FATIGUE: Status: RESOLVED | Noted: 2022-07-25 | Resolved: 2024-09-20

## 2024-09-20 PROBLEM — R19.7 DIARRHEA: Status: RESOLVED | Noted: 2022-07-25 | Resolved: 2024-09-20

## 2024-09-20 PROBLEM — R10.12 LUQ PAIN: Status: RESOLVED | Noted: 2023-07-03 | Resolved: 2024-09-20

## 2024-09-20 PROBLEM — R21 RASH: Status: RESOLVED | Noted: 2021-08-31 | Resolved: 2024-09-20

## 2024-09-20 PROBLEM — R11.0 NAUSEA: Status: RESOLVED | Noted: 2022-07-25 | Resolved: 2024-09-20

## 2024-09-20 LAB
BASOPHILS # BLD AUTO: 0.5 % (ref 0–1.8)
BASOPHILS # BLD: 0.03 K/UL (ref 0–0.12)
EOSINOPHIL # BLD AUTO: 0.12 K/UL (ref 0–0.51)
EOSINOPHIL NFR BLD: 2.1 % (ref 0–6.9)
ERYTHROCYTE [DISTWIDTH] IN BLOOD BY AUTOMATED COUNT: 38.8 FL (ref 35.9–50)
HCT VFR BLD AUTO: 46.2 % (ref 42–52)
HGB BLD-MCNC: 15.9 G/DL (ref 14–18)
IMM GRANULOCYTES # BLD AUTO: 0.01 K/UL (ref 0–0.11)
IMM GRANULOCYTES NFR BLD AUTO: 0.2 % (ref 0–0.9)
LYMPHOCYTES # BLD AUTO: 1.85 K/UL (ref 1–4.8)
LYMPHOCYTES NFR BLD: 32.9 % (ref 22–41)
MCH RBC QN AUTO: 31.5 PG (ref 27–33)
MCHC RBC AUTO-ENTMCNC: 34.4 G/DL (ref 32.3–36.5)
MCV RBC AUTO: 91.5 FL (ref 81.4–97.8)
MONOCYTES # BLD AUTO: 0.46 K/UL (ref 0–0.85)
MONOCYTES NFR BLD AUTO: 8.2 % (ref 0–13.4)
NEUTROPHILS # BLD AUTO: 3.15 K/UL (ref 1.82–7.42)
NEUTROPHILS NFR BLD: 56.1 % (ref 44–72)
NRBC # BLD AUTO: 0 K/UL
NRBC BLD-RTO: 0 /100 WBC (ref 0–0.2)
PLATELET # BLD AUTO: 179 K/UL (ref 164–446)
PMV BLD AUTO: 11.2 FL (ref 9–12.9)
RBC # BLD AUTO: 5.05 M/UL (ref 4.7–6.1)
WBC # BLD AUTO: 5.6 K/UL (ref 4.8–10.8)

## 2024-09-20 PROCEDURE — 80053 COMPREHEN METABOLIC PANEL: CPT

## 2024-09-20 PROCEDURE — 85025 COMPLETE CBC W/AUTO DIFF WBC: CPT

## 2024-09-20 PROCEDURE — 90471 IMMUNIZATION ADMIN: CPT | Performed by: PHYSICIAN ASSISTANT

## 2024-09-20 PROCEDURE — 36415 COLL VENOUS BLD VENIPUNCTURE: CPT

## 2024-09-20 PROCEDURE — 90472 IMMUNIZATION ADMIN EACH ADD: CPT | Performed by: PHYSICIAN ASSISTANT

## 2024-09-20 PROCEDURE — 90632 HEPA VACCINE ADULT IM: CPT | Mod: JZ | Performed by: PHYSICIAN ASSISTANT

## 2024-09-20 PROCEDURE — 80061 LIPID PANEL: CPT

## 2024-09-20 PROCEDURE — 99395 PREV VISIT EST AGE 18-39: CPT | Mod: 25 | Performed by: PHYSICIAN ASSISTANT

## 2024-09-20 PROCEDURE — 90656 IIV3 VACC NO PRSV 0.5 ML IM: CPT | Performed by: PHYSICIAN ASSISTANT

## 2024-09-20 PROCEDURE — 82306 VITAMIN D 25 HYDROXY: CPT

## 2024-09-20 ASSESSMENT — PATIENT HEALTH QUESTIONNAIRE - PHQ9: CLINICAL INTERPRETATION OF PHQ2 SCORE: 0

## 2024-09-20 ASSESSMENT — FIBROSIS 4 INDEX: FIB4 SCORE: 0.6

## 2024-09-20 NOTE — PROGRESS NOTES
Subjective:     CC:    Chief Complaint   Patient presents with    Annual Exam     Physical         HISTORY OF THE PRESENT ILLNESS: Patient is a 29 y.o. male here for his annual physical.  No specific concerns.  He is trying to increase muscle mass and is working out and eating more.    Health Maintenance/Anticipatory Guidance:  Diet:   - 3 meals per day   - 2 servings of fruits/veggies per day   - 80+ ounces of water   - 0 cups of caffeine   - 0-1 days per week eating out  Exercise:    - Cardio: walking   - Weight/resistance trainin days per week   - Other activities: N/A  Substance Abuse: no history  Safe in Relationships: yes  Sun Protection/Sunscreen: yes    Dermatologist: deferred  Dentist: twice yearly appointments  Eye Doctor: deferred  Seatbelts, helmets, and gun safety discussed    Cancer Screening: reviewed    Infectious Disease Screening:  STI/HIV screening: declined    Immunizations: due for hep A and flu shot today    Depression Screening    Little interest or pleasure in doing things?  0 - not at all  Feeling down, depressed , or hopeless? 0 - not at all  Patient Health Questionnaire Score: 0    The ASCVD Risk score (Krysta MORENO, et al., 2019) failed to calculate for the following reasons:    The 2019 ASCVD risk score is only valid for ages 40 to 79    Allergies:   Patient has no known allergies.  No current [facility]-ordered outpatient medications on file.     No current [facility]-ordered facility-administered medications on file.     Past Medical History:   Diagnosis Date    Elevated CPK 2021    Myalgia 2021    Sepsis (HCC) 2021    Vaccination reaction 2021     History reviewed. No pertinent surgical history.  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     From [place], [place]    No  work    No blood transfusion    No tattoos     Family History   Problem Relation Age of Onset    Hyperlipidemia Father     Cancer Father 61        Non-Hodgkins in 2019    Cancer Paternal Grandmother         breast cancer        ROS: see hpi  Gen: no fevers/chills  Pulm: no sob, no cough  CV: no chest pain, no palpitations  GI: no nausea/vomiting, no diarrhea  Skin: no rash      Objective:     Exam: /72 (BP Location: Right arm, Patient Position: Sitting, BP Cuff Size: Adult)   Pulse 67   Temp 36.5 °C (97.7 °F) (Temporal)   Resp 15   Ht 1.829 m (6')   Wt 91.9 kg (202 lb 9.6 oz)   SpO2 97%  Body mass index is 27.48 kg/m².    Gen: Alert and oriented, No apparent distress.  HEENT: Head atraumatic, normocephalic. PERRLA. EOMI. B/L TMs pearly gray without erythema or bulge.  Posterior pharynx non-erythematous.  Neck: Neck is supple without lymphadenopathy.  Full range of motion.  Lungs: Normal effort, CTA bilaterally, no wheezes, rhonchi, or rales  CV: Regular rate and rhythm. No murmurs, rubs, or gallops.  ABD: +BS. Non-tender, non-distended. No rebound, rigidity, or guarding.  Ext: No clubbing, cyanosis, edema.  2+ radial and dorsalis pedis pulses.  Neuro: CN II-XII intact, coordination intact bilaterally, no foot drop.  Skin: No rash or ulcerations.    Assessment & Plan:   29 y.o. male with the following -    1. Wellness examination  PMH/PSH/FH/Social history reviewed.  Vaccinations discussed.  Previous records and labs reviewed. Discussed age appropriate anticipatory guidance.    2. BMI 27.0-27.9,adult  Please continue working on lifestyle modifications.  There are 4 types of exercise that are recommended.  Endurance training includes 150 minutes or more of moderate-intensity activity each week as tolerated.  It is also recommended to incorporate exercises to help with flexibility and balance twice per week, and weight/resistance  training twice per week.  The benefits of weight training include increased strength of the bones, muscles, and connective tissues which ultimately helps to stabilize your joints, lower risk of injury, decrease fall risk, improve your metabolism, and improve quality of life long-term.  If you are weightlifting twice a week, one day can be focus more on your lower body and the other on your upper body.  If you need assistance with a weight training program, please consult with a certified  or schedule a consultation in my office for guidance.    Additionally, please follow a healthy diet that is low in saturated fat, sodium, and cholesterol, such as the mediterranean diet.  Please focus on a diet that is high in fruits, vegetables, whole grains, beans, nuts, and seeds, includes olive oil as an important source of monounsaturated fat. You may also consider a plant-based diet.  Please also increase your dietary fiber intake to 25 to 30 g a day and limit added sugar to <30 g/day.      In regards to alcohol intake, the 2020 to 2025 Dietary Guidelines for Americans advise no more than two drinks per day for males and one drink per day for nonpregnant females, although complete cessation would be ideal to reduce risks of long term health effects from alcohol.    Please consume at least 5-6 servings of fruits and vegetables per day. Studies show that consuming 5 servings of fruits and vegetables per day can increase life expectancy by 3 years.  Even eating 2.5 servings of fruits and vegetables per day can be associated with a 16% reduced risk of heart disease, 18% reduced risk of stroke, 4% reduced risk of cancer, and 15% reduced risk of premature death.    I would also recommend protein consumption with every meal.  Protein sources include beans, lentils, chickpeas, nuts and seeds, eggs, high protein yogurt (>15 g), cottage cheese, lean meats such as salmon and tuna, soy products i.e. tofu, as well as supplementary  protein powders that are low in added sugar.    3. Diabetes mellitus screening  - Comp Metabolic Panel; Future    4. Screening for deficiency anemia  - CBC WITH DIFFERENTIAL; Future    5. Screening cholesterol level  - Lipid Profile; Future    6. Encounter for vitamin deficiency screening  - VITAMIN D,25 HYDROXY (DEFICIENCY); Future    7. Genetic testing  - Referral to Genetic Research Studies    8. Need for vaccination  - INFLUENZA VACCINE QUAD INJ (PF)  - Hepatitis A Vaccine Adult 19+    Return for Will notify patient to follow-up pending tests.    Yocasta Cramer) Keith SOLORIO  Physician Assistant Certified  [facility]    Please note that this dictation was created using voice recognition software. I have made every reasonable attempt to correct obvious errors, but I expect that there are errors of grammar and possibly content that I did not discover before finalizing the note.

## 2024-09-20 NOTE — PATIENT INSTRUCTIONS
It was a pleasure meeting with you today at [facility]!    Your medical history/records and medications were reviewed today.     UPDATE on MyChart Results: If you have blood work, and/or imaging studies, or any other test or procedure completed, you will have access to results as soon as they become available in MyChart. Recently, these results will be available for review at the same time that your provider is able to see results!    This will likely mean you will see a result before your provider has had a chance to review and discuss with you.  Some results or care notes may be hard to understand and may be serious in nature.    We look at every result and your provider will contact you to explain what they mean and discuss appropriate next steps. Please allow for at least 72 business hours for chart and result review.     We prefer that you wait for your care team to contact you with your results.  Often, your provider will discuss your results with you at your next appointment. We look forward to continuing to partner with you in your care.    Please review my practice information below:    If you have any prescription refill requests, please send them via stickK or discuss with your provider at the start of your office visits. Please allow 3-5 business days for lab and testing review and you will be contacted via stickK with those results, or if advised to make a follow up appointment regarding those results, then please do so.     Once resulted, your lab/test/imaging results will show up automatically in your MyChart. Please wait for my interpretation and recommendations prior to viewing your results to avoid any unnecessary confusion or misinterpretation. I will address all of the lab values that I interpret as abnormal and message you accordingly on your MyChart. I will always send you a message about your results even if they are normal. If you do not hear back from me within 5-7 business  days after completing your tests, then please send me a message on [employer] so I can obtain your results (especially if you went to an outside lab or imaging center - [facility], [facility], etc).     If you have any additional questions or concerns beyond my interpretation of your results, please make an appointment with me to discuss in further detail.    Please only use the [employer] messaging system for questions regarding your most recent appointment or if advised to use otherwise (glucose or blood pressure reporting).     If you have any new problems or concerns, you must make an appointment to discuss. This includes any referral requests, lab requests (unless advised to notify me for pre-appt labs), medication side effects, or request for medication adjustments.     Please arrive 15 minutes prior to your appointment time to complete your check-in and intake with the medical assistant.      Thank you,    Yocasta Parker PA-C ([place])  Physician Assistant Certified  [facility]    -----------------------------------------------------------------    Attn: Patients of [facility]:    In an effort to continue to provide excellent and efficient care to our patients, it is vital that we continue to use our resources appropriately. With that, this is a reminder that [employer] is used for prescription refill requests, test results, virtual visits, and chart review only.     Any new questions, concerns/conditions, lab/imaging requests, medication adjustments, new prescriptions, or referral requests do require an appointment (virtually or in person), unless discussed otherwise at your most recent appointment.     Thank you for your understanding,    [facility]

## 2024-09-21 LAB
25(OH)D3 SERPL-MCNC: 22 NG/ML (ref 30–100)
ALBUMIN SERPL BCP-MCNC: 4.1 G/DL (ref 3.2–4.9)
ALBUMIN/GLOB SERPL: 1.5 G/DL
ALP SERPL-CCNC: 97 U/L (ref 30–99)
ALT SERPL-CCNC: 67 U/L (ref 2–50)
ANION GAP SERPL CALC-SCNC: 11 MMOL/L (ref 7–16)
AST SERPL-CCNC: 164 U/L (ref 12–45)
BILIRUB SERPL-MCNC: 0.4 MG/DL (ref 0.1–1.5)
BUN SERPL-MCNC: 16 MG/DL (ref 8–22)
CALCIUM ALBUM COR SERPL-MCNC: 9 MG/DL (ref 8.5–10.5)
CALCIUM SERPL-MCNC: 9.1 MG/DL (ref 8.5–10.5)
CHLORIDE SERPL-SCNC: 104 MMOL/L (ref 96–112)
CHOLEST SERPL-MCNC: 187 MG/DL (ref 100–199)
CO2 SERPL-SCNC: 25 MMOL/L (ref 20–33)
CREAT SERPL-MCNC: 0.78 MG/DL (ref 0.5–1.4)
GFR SERPLBLD CREATININE-BSD FMLA CKD-EPI: 124 ML/MIN/1.73 M 2
GLOBULIN SER CALC-MCNC: 2.8 G/DL (ref 1.9–3.5)
GLUCOSE SERPL-MCNC: 90 MG/DL (ref 65–99)
HDLC SERPL-MCNC: 42 MG/DL
LDLC SERPL CALC-MCNC: 125 MG/DL
POTASSIUM SERPL-SCNC: 4.7 MMOL/L (ref 3.6–5.5)
PROT SERPL-MCNC: 6.9 G/DL (ref 6–8.2)
SODIUM SERPL-SCNC: 140 MMOL/L (ref 135–145)
TRIGL SERPL-MCNC: 98 MG/DL (ref 0–149)

## 2024-09-23 DIAGNOSIS — R74.01 TRANSAMINITIS: ICD-10-CM

## 2024-09-26 ENCOUNTER — HOSPITAL ENCOUNTER (OUTPATIENT)
Dept: LAB | Facility: MEDICAL CENTER | Age: 29
End: 2024-09-26
Attending: PHYSICIAN ASSISTANT
Payer: COMMERCIAL

## 2024-09-26 DIAGNOSIS — R74.01 TRANSAMINITIS: ICD-10-CM

## 2024-09-26 LAB
ALBUMIN SERPL BCP-MCNC: 4.1 G/DL (ref 3.2–4.9)
ALP SERPL-CCNC: 93 U/L (ref 30–99)
ALT SERPL-CCNC: 67 U/L (ref 2–50)
AST SERPL-CCNC: 47 U/L (ref 12–45)
BILIRUB CONJ SERPL-MCNC: 0.2 MG/DL (ref 0.1–0.5)
BILIRUB INDIRECT SERPL-MCNC: 0.4 MG/DL (ref 0–1)
BILIRUB SERPL-MCNC: 0.6 MG/DL (ref 0.1–1.5)
PROT SERPL-MCNC: 7 G/DL (ref 6–8.2)

## 2024-09-26 PROCEDURE — 80076 HEPATIC FUNCTION PANEL: CPT

## 2024-09-26 PROCEDURE — 36415 COLL VENOUS BLD VENIPUNCTURE: CPT

## 2025-03-07 PROCEDURE — RXMED WILLOW AMBULATORY MEDICATION CHARGE: Performed by: INTERNAL MEDICINE

## 2025-05-01 ENCOUNTER — PHARMACY VISIT (OUTPATIENT)
Dept: PHARMACY | Facility: MEDICAL CENTER | Age: 30
End: 2025-05-01
Payer: COMMERCIAL